# Patient Record
Sex: MALE | Race: BLACK OR AFRICAN AMERICAN | NOT HISPANIC OR LATINO | Employment: FULL TIME | URBAN - METROPOLITAN AREA
[De-identification: names, ages, dates, MRNs, and addresses within clinical notes are randomized per-mention and may not be internally consistent; named-entity substitution may affect disease eponyms.]

---

## 2018-02-05 ENCOUNTER — HOSPITAL ENCOUNTER (EMERGENCY)
Facility: HOSPITAL | Age: 48
Discharge: HOME | End: 2018-02-05
Attending: EMERGENCY MEDICINE | Admitting: EMERGENCY MEDICINE

## 2018-02-05 VITALS
SYSTOLIC BLOOD PRESSURE: 198 MMHG | WEIGHT: 197.75 LBS | RESPIRATION RATE: 18 BRPM | OXYGEN SATURATION: 100 % | DIASTOLIC BLOOD PRESSURE: 120 MMHG | TEMPERATURE: 97.8 F | BODY MASS INDEX: 27.58 KG/M2 | HEART RATE: 94 BPM

## 2018-02-05 DIAGNOSIS — B86 NORWEGIAN SCABIES: Primary | ICD-10-CM

## 2018-02-05 PROCEDURE — 96372 THER/PROPH/DIAG INJ SC/IM: CPT

## 2018-02-05 PROCEDURE — 99282 EMERGENCY DEPT VISIT SF MDM: CPT

## 2018-02-05 RX ORDER — IVERMECTIN 3 MG/1
200 TABLET ORAL ONCE
Qty: 42 TABLET | Refills: 0 | Status: SHIPPED | OUTPATIENT
Start: 2018-02-05 | End: 2018-02-05

## 2018-02-05 RX ORDER — PERMETHRIN 50 MG/G
CREAM TOPICAL
Qty: 60 G | Refills: 6 | Status: SHIPPED | OUTPATIENT
Start: 2018-02-05 | End: 2021-02-07

## 2018-02-05 RX ORDER — PERMETHRIN 50 MG/G
CREAM TOPICAL ONCE
Status: COMPLETED | OUTPATIENT
Start: 2018-02-05 | End: 2018-02-05

## 2018-02-05 RX ORDER — METHYLPREDNISOLONE ACETATE 80 MG/ML
40 INJECTION, SUSPENSION INTRA-ARTICULAR; INTRALESIONAL; INTRAMUSCULAR; SOFT TISSUE ONCE
Status: COMPLETED | OUTPATIENT
Start: 2018-02-05 | End: 2018-02-05

## 2018-02-05 RX ORDER — KETOROLAC TROMETHAMINE 30 MG/ML
30 INJECTION, SOLUTION INTRAMUSCULAR; INTRAVENOUS ONCE
Status: COMPLETED | OUTPATIENT
Start: 2018-02-05 | End: 2018-02-05

## 2018-02-05 RX ADMIN — KETOROLAC TROMETHAMINE 30 MG: 30 INJECTION, SOLUTION INTRAMUSCULAR at 04:09

## 2018-02-05 RX ADMIN — PERMETHRIN: 50 CREAM TOPICAL at 04:10

## 2018-02-05 RX ADMIN — METHYLPREDNISOLONE ACETATE 40 MG: 80 INJECTION, SUSPENSION INTRA-ARTICULAR; INTRALESIONAL; INTRAMUSCULAR; SOFT TISSUE at 04:10

## 2018-02-05 NOTE — ED PROVIDER NOTES
History  Chief Complaint   Patient presents with    Rash     Worsening rash since this morning  Was evaluated at [facility] but has had no relief in symptoms  Started in lower abdomen and is rising  Burning pain present, itchy  Denies difficulty breathing/swallowing  Nonlabored respirations present  This is a 56year old male who states he has had itching around his lower abdomen, at waist, and under bilateral arms about 1 week ago  He states he went to [facility] and was given something for his BP and a "2% cream"  He states he through the cream out  He states he was living in a place that had bed bugs but not known scabies  He states that the rash itches and burns  History provided by:  Patient and medical records   used: No    Rash   Location:  Pelvis (B/L arm pits )  Pelvic rash location:  Pelvis, penis, perineum and groin  Severity:  Severe  Onset quality:  Gradual  Duration:  1 week  Timing:  Constant  Progression:  Worsening  Chronicity:  New  Context: insect bite/sting    Relieved by:  Nothing  Ineffective treatments:  Anti-fungal cream and topical steroids      None       Past Medical History:   Diagnosis Date    Hypertension        History reviewed  No pertinent surgical history  History reviewed  No pertinent family history  I have reviewed and agree with the history as documented  Social History   Substance Use Topics    Smoking status: Never Smoker    Smokeless tobacco: Never Used    Alcohol use Yes      Comment: Socially        Review of Systems   Constitutional: Negative  HENT: Negative  Eyes: Negative  Respiratory: Negative  Cardiovascular: Negative  Gastrointestinal: Negative  Endocrine: Negative  Genitourinary: Negative  Musculoskeletal: Negative  Skin: Positive for rash  Allergic/Immunologic: Negative  Neurological: Negative  Hematological: Negative  Psychiatric/Behavioral: Negative          Physical Exam  ED Triage Vitals [02/05/18 0350]   Temperature Pulse Respirations Blood Pressure SpO2   97 8 °F (36 6 °C) 94 18 (!) 198/120 100 %      Temp Source Heart Rate Source Patient Position - Orthostatic VS BP Location FiO2 (%)   Oral Monitor Sitting Right arm --      Pain Score       8           Orthostatic Vital Signs  Vitals:    02/05/18 0350   BP: (!) 198/120   Pulse: 94   Patient Position - Orthostatic VS: Sitting       Physical Exam   Constitutional: He is oriented to person, place, and time  He appears well-developed and well-nourished  He appears distressed  Musculoskeletal: Normal range of motion  Neurological: He is alert and oriented to person, place, and time  Skin: Skin is warm and dry  Rash noted  He is not diaphoretic  There is a dry scaly brown colored rash with red pinpoint scabbed areas over lower abdomen, groin, pelvis pubis area  into umbilicus area  No oozing noted  Same description at B/L axillae areas    Psychiatric: He has a normal mood and affect  His behavior is normal  Judgment and thought content normal    Nursing note and vitals reviewed  ED Medications  Medications   methylPREDNISolone acetate (DEPO-MEDROL) injection 40 mg (40 mg Intramuscular Given 2/5/18 0410)   ketorolac (TORADOL) injection 30 mg (30 mg Intramuscular Given 2/5/18 0409)   permethrin (ELIMITE) 5 % cream ( Topical Given 2/5/18 0410)       Diagnostic Studies  Results Reviewed     None                 No orders to display              Procedures  Procedures       Phone Contacts  ED Phone Contact    ED Course  ED Course as of Feb 05 0429   Mon Feb 05, 2018   0428 Pt verbalizes understanding of all d/c instructions and procedures                                    MDM  Number of Diagnoses or Management Options  [place] scabies:   Diagnosis management comments: [place] Scabies     permetherin Topical  Ivermectin po     Pt will need follow up with PCP          Amount and/or Complexity of Data Reviewed  Review and summarize past medical records: yes      CritCare Time    Disposition  Final diagnoses:   [place] scabies     Time reflects when diagnosis was documented in both MDM as applicable and the Disposition within this note     Time User Action Codes Description Comment    2/5/2018  4:09 AM Tyrese, [address] [facility] scabies       ED Disposition     ED Disposition Condition Comment    Discharge  Josephine Mendenhall discharge to home/self care  Condition at discharge: Good        Follow-up Information     Follow up With Specialties Details Why Contact Info Additional [facility] [place] [facility] Schedule an appointment as soon as possible for a visit in 3 days  [address] [place] [phone]  [address] [facility] will need to find a PCP for follow up - call this number for a physician  [address] [facility] Emergency Medicine  If symptoms worsen [address] [facility] ED, [address] [place], [place], [address]        Patient's Medications   Discharge Prescriptions    IVERMECTIN (STROMECTOL) 3 MG TABS    Take 6 tablets (18,000 mcg total) by mouth once for 1 dose Take 6 tabs po on day 1, 2, 8, 9, 15, 22, 29  Starting 2/5/18       Start Date: 2/5/2018  End Date: 2/5/2018       Order Dose: 18,000 mcg       Quantity: 42 tablet    Refills: 0    PERMETHRIN (ELIMITE) 5 % CREAM    Apply neck to toes every other day x 1 week  Then apply 2 x week until cured       Start Date: 2/5/2018  End Date: --       Order Dose: --       Quantity: 60 g    Refills: 6     No discharge procedures on file      ED Provider  Electronically Signed by           Lakshmi Juárez  02/05/18 7203

## 2018-02-05 NOTE — DISCHARGE INSTRUCTIONS
You appear to have [place] scabies  This is contagious  You must follow the directions for the medications as prescribed  You must wash all clothing  Avoid personal contact  Follow up with a PCP      Scabies   WHAT YOU NEED TO KNOW:   Scabies is a skin condition that is caused by scabies mites  Scabies mites are tiny bugs that burrow, lay eggs, and live underneath the skin  Scabies is spread through close contact with a person who has scabies  This includes having sex, sleeping in the same bed, or sharing towels or clothing  Scabies can spread quickly and must be treated as soon as it is found  DISCHARGE INSTRUCTIONS:   Return to the emergency department if:   · You develop a fever and red, swollen, painful areas on your skin  Contact your healthcare provider if:   · The bites become crusty or filled with pus  · You have worsening itching after scabies treatment  · You have new bite or burrow marks after treatment  · You have questions or concerns about your condition or care  Medicines:   · Prescription creams  are used to treat scabies  You will need to apply them over all of your body from the neck down  Do not swallow this medicine  An oral medication may be ordered if scabies is severe  · Take your medicine as directed  Contact your healthcare provider if you think your medicine is not helping or if you have side effects  Tell him or her if you are allergic to any medicine  Keep a list of the medicines, vitamins, and herbs you take  Include the amounts, and when and why you take them  Bring the list or the pill bottles to follow-up visits  Carry your medicine list with you in case of an emergency  Follow up with your healthcare provider as directed:  Write down your questions so you remember to ask them during your visits  Prevent the spread of scabies:   · Have all family members use scabies medicine   Tell all sex partners and anyone who has shared your clothing or bed for the past month about the scabies  Tell them to ask their healthcare provider for scabies medicine even if they have no itching, rash, or burrow marks  · Wash all items that you have used since 3 days before you learned about your scabies  Use hot water to wash all clothing, bedding, and towels  Dry them for at least 20 minutes on the hot cycle of a dryer  Take items to be dry cleaned that cannot be washed in a washing machine  Place any clothing or bedding that cannot be washed or dry cleaned in a closed plastic bag for 1 week  · Do not have close body contact with anyone until the scabies mites are gone  Talk to your healthcare provider about how long you need to wait  Also ask about public places to avoid, such as the gym  Help relieve itching: Your skin may continue to itch for 2 or 3 weeks, even after the scabies mites are gone  Over-the-counter antihistamines or cortisone cream may help relieve itching  Trim your fingernails so you do not spread any mites that are still alive after treatment  Do not scratch your skin  Scratches may cause a skin infection  A cool bath may also help relieve the itching  Return to school or work: You may return to school or work 24 hours after using scabies medicine  © 2017 [address] [employer] Information is for End User's use only and may not be sold, redistributed or otherwise used for commercial purposes  All illustrations and images included in CareNotes® are the copyrighted property of [employer]  or [employer]  The above information is an  only  It is not intended as medical advice for individual conditions or treatments  Talk to your doctor, nurse or pharmacist before following any medical regimen to see if it is safe and effective for you

## 2019-04-30 ENCOUNTER — HOSPITAL ENCOUNTER (EMERGENCY)
Facility: HOSPITAL | Age: 49
Discharge: HOME | End: 2019-04-30
Attending: EMERGENCY MEDICINE

## 2019-04-30 VITALS
BODY MASS INDEX: 27.33 KG/M2 | RESPIRATION RATE: 16 BRPM | HEART RATE: 76 BPM | SYSTOLIC BLOOD PRESSURE: 170 MMHG | WEIGHT: 195.99 LBS | TEMPERATURE: 97.4 F | DIASTOLIC BLOOD PRESSURE: 120 MMHG | OXYGEN SATURATION: 100 %

## 2019-04-30 DIAGNOSIS — I10 HYPERTENSION: ICD-10-CM

## 2019-04-30 DIAGNOSIS — L25.9 CONTACT DERMATITIS: Primary | ICD-10-CM

## 2019-04-30 PROCEDURE — 99282 EMERGENCY DEPT VISIT SF MDM: CPT

## 2019-04-30 PROCEDURE — 99283 EMERGENCY DEPT VISIT LOW MDM: CPT | Performed by: PHYSICIAN ASSISTANT

## 2019-04-30 RX ORDER — HYDROCORTISONE 25 MG/ML
LOTION TOPICAL 2 TIMES DAILY
Qty: 50 ML | Refills: 0 | Status: SHIPPED | OUTPATIENT
Start: 2019-04-30 | End: 2021-02-07

## 2019-04-30 RX ORDER — HYDROCHLOROTHIAZIDE 25 MG/1
25 TABLET ORAL DAILY
Qty: 20 TABLET | Refills: 0 | Status: SHIPPED | OUTPATIENT
Start: 2019-04-30 | End: 2021-02-11 | Stop reason: HOSPADM

## 2021-02-07 ENCOUNTER — APPOINTMENT (EMERGENCY)
Dept: RADIOLOGY | Facility: HOSPITAL | Age: 51
DRG: 563 | End: 2021-02-07

## 2021-02-07 ENCOUNTER — HOSPITAL ENCOUNTER (INPATIENT)
Facility: HOSPITAL | Age: 51
LOS: 2 days | Discharge: HOME | DRG: 563 | End: 2021-02-11
Attending: EMERGENCY MEDICINE | Admitting: INTERNAL MEDICINE

## 2021-02-07 DIAGNOSIS — S82.401A: ICD-10-CM

## 2021-02-07 DIAGNOSIS — I16.9 HYPERTENSIVE CRISIS: Primary | ICD-10-CM

## 2021-02-07 DIAGNOSIS — S82.409A FIBULA FRACTURE: ICD-10-CM

## 2021-02-07 DIAGNOSIS — D64.9 ANEMIA, UNSPECIFIED TYPE: ICD-10-CM

## 2021-02-07 DIAGNOSIS — N17.9 AKI (ACUTE KIDNEY INJURY) (HCC): ICD-10-CM
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
## 2021-02-07 PROBLEM — S99.919A ANKLE INJURY: Status: ACTIVE | Noted: 2021-02-07

## 2021-02-07 PROBLEM — I16.0 HYPERTENSIVE URGENCY: Status: ACTIVE | Noted: 2021-02-07

## 2021-02-07 PROBLEM — I10 ESSENTIAL HYPERTENSION: Status: ACTIVE | Noted: 2021-02-07

## 2021-02-07 LAB
ALBUMIN SERPL BCP-MCNC: 4 G/DL (ref 3.5–5)
ALP SERPL-CCNC: 71 U/L (ref 46–116)
ALT SERPL W P-5'-P-CCNC: 24 U/L (ref 12–78)
ANION GAP SERPL CALCULATED.3IONS-SCNC: 7 MMOL/L (ref 4–13)
APTT PPP: 28 SECONDS (ref 23–37)
AST SERPL W P-5'-P-CCNC: 22 U/L (ref 5–45)
BASOPHILS # BLD AUTO: 0.01 THOUSANDS/ΜL (ref 0–0.1)
BASOPHILS NFR BLD AUTO: 0 % (ref 0–1)
BILIRUB SERPL-MCNC: 0.33 MG/DL (ref 0.2–1)
BUN SERPL-MCNC: 17 MG/DL (ref 5–25)
CALCIUM SERPL-MCNC: 9 MG/DL (ref 8.3–10.1)
CHLORIDE SERPL-SCNC: 104 MMOL/L (ref 100–108)
CO2 SERPL-SCNC: 28 MMOL/L (ref 21–32)
CREAT SERPL-MCNC: 1.72 MG/DL (ref 0.6–1.3)
EOSINOPHIL # BLD AUTO: 0.02 THOUSAND/ΜL (ref 0–0.61)
EOSINOPHIL NFR BLD AUTO: 0 % (ref 0–6)
ERYTHROCYTE [DISTWIDTH] IN BLOOD BY AUTOMATED COUNT: 13.3 % (ref 11.6–15.1)
GFR SERPL CREATININE-BSD FRML MDRD: 53 ML/MIN/1.73SQ M
GLUCOSE SERPL-MCNC: 111 MG/DL (ref 65–140)
HCT VFR BLD AUTO: 36.9 % (ref 36.5–49.3)
HGB BLD-MCNC: 11.5 G/DL (ref 12–17)
IMM GRANULOCYTES # BLD AUTO: 0.02 THOUSAND/UL (ref 0–0.2)
IMM GRANULOCYTES NFR BLD AUTO: 0 % (ref 0–2)
INR PPP: 1.09 (ref 0.84–1.19)
LYMPHOCYTES # BLD AUTO: 1.47 THOUSANDS/ΜL (ref 0.6–4.47)
LYMPHOCYTES NFR BLD AUTO: 24 % (ref 14–44)
MCH RBC QN AUTO: 26.9 PG (ref 26.8–34.3)
MCHC RBC AUTO-ENTMCNC: 31.2 G/DL (ref 31.4–37.4)
MCV RBC AUTO: 86 FL (ref 82–98)
MONOCYTES # BLD AUTO: 0.51 THOUSAND/ΜL (ref 0.17–1.22)
MONOCYTES NFR BLD AUTO: 8 % (ref 4–12)
NEUTROPHILS # BLD AUTO: 4.12 THOUSANDS/ΜL (ref 1.85–7.62)
NEUTS SEG NFR BLD AUTO: 68 % (ref 43–75)
NRBC BLD AUTO-RTO: 0 /100 WBCS
PLATELET # BLD AUTO: 262 THOUSANDS/UL (ref 149–390)
PMV BLD AUTO: 10.8 FL (ref 8.9–12.7)
POTASSIUM SERPL-SCNC: 3.8 MMOL/L (ref 3.5–5.3)
PROT SERPL-MCNC: 8.4 G/DL (ref 6.4–8.2)
PROTHROMBIN TIME: 13.9 SECONDS (ref 11.6–14.5)
RBC # BLD AUTO: 4.28 MILLION/UL (ref 3.88–5.62)
SODIUM SERPL-SCNC: 139 MMOL/L (ref 136–145)
TROPONIN I SERPL-MCNC: <0.02 NG/ML
WBC # BLD AUTO: 6.15 THOUSAND/UL (ref 4.31–10.16)

## 2021-02-07 PROCEDURE — 80053 COMPREHEN METABOLIC PANEL: CPT | Performed by: NURSE PRACTITIONER

## 2021-02-07 PROCEDURE — 85610 PROTHROMBIN TIME: CPT | Performed by: NURSE PRACTITIONER

## 2021-02-07 PROCEDURE — 84484 ASSAY OF TROPONIN QUANT: CPT | Performed by: NURSE PRACTITIONER

## 2021-02-07 PROCEDURE — 36415 COLL VENOUS BLD VENIPUNCTURE: CPT | Performed by: NURSE PRACTITIONER

## 2021-02-07 PROCEDURE — 85025 COMPLETE CBC W/AUTO DIFF WBC: CPT | Performed by: NURSE PRACTITIONER

## 2021-02-07 PROCEDURE — 99285 EMERGENCY DEPT VISIT HI MDM: CPT | Performed by: NURSE PRACTITIONER

## 2021-02-07 PROCEDURE — 73610 X-RAY EXAM OF ANKLE: CPT

## 2021-02-07 PROCEDURE — 96375 TX/PRO/DX INJ NEW DRUG ADDON: CPT

## 2021-02-07 PROCEDURE — 71045 X-RAY EXAM CHEST 1 VIEW: CPT

## 2021-02-07 PROCEDURE — 73590 X-RAY EXAM OF LOWER LEG: CPT

## 2021-02-07 PROCEDURE — 82550 ASSAY OF CK (CPK): CPT | Performed by: INTERNAL MEDICINE

## 2021-02-07 PROCEDURE — 99285 EMERGENCY DEPT VISIT HI MDM: CPT

## 2021-02-07 PROCEDURE — 29505 APPLICATION LONG LEG SPLINT: CPT | Performed by: NURSE PRACTITIONER

## 2021-02-07 PROCEDURE — 99220 PR INITIAL OBSERVATION CARE/DAY 70 MINUTES: CPT | Performed by: INTERNAL MEDICINE

## 2021-02-07 PROCEDURE — 82553 CREATINE MB FRACTION: CPT | Performed by: INTERNAL MEDICINE

## 2021-02-07 PROCEDURE — 2W3QX1Z IMMOBILIZATION OF RIGHT LOWER LEG USING SPLINT: ICD-10-PCS | Performed by: EMERGENCY MEDICINE

## 2021-02-07 PROCEDURE — 85730 THROMBOPLASTIN TIME PARTIAL: CPT | Performed by: NURSE PRACTITIONER

## 2021-02-07 PROCEDURE — 96376 TX/PRO/DX INJ SAME DRUG ADON: CPT

## 2021-02-07 PROCEDURE — 96374 THER/PROPH/DIAG INJ IV PUSH: CPT

## 2021-02-07 PROCEDURE — 93005 ELECTROCARDIOGRAM TRACING: CPT

## 2021-02-07 RX ORDER — SODIUM CHLORIDE 9 MG/ML
125 INJECTION, SOLUTION INTRAVENOUS CONTINUOUS
Status: DISCONTINUED | OUTPATIENT
Start: 2021-02-07 | End: 2021-02-08

## 2021-02-07 RX ORDER — OXYCODONE HYDROCHLORIDE 5 MG/1
5 TABLET ORAL EVERY 4 HOURS PRN
Status: DISCONTINUED | OUTPATIENT
Start: 2021-02-07 | End: 2021-02-11 | Stop reason: HOSPADM

## 2021-02-07 RX ORDER — HYDROMORPHONE HCL/PF 1 MG/ML
0.5 SYRINGE (ML) INJECTION
Status: DISCONTINUED | OUTPATIENT
Start: 2021-02-07 | End: 2021-02-11 | Stop reason: HOSPADM

## 2021-02-07 RX ORDER — SODIUM CHLORIDE 9 MG/ML
75 INJECTION, SOLUTION INTRAVENOUS CONTINUOUS
Status: DISCONTINUED | OUTPATIENT
Start: 2021-02-07 | End: 2021-02-07

## 2021-02-07 RX ORDER — LABETALOL 20 MG/4 ML (5 MG/ML) INTRAVENOUS SYRINGE
10 ONCE
Status: COMPLETED | OUTPATIENT
Start: 2021-02-07 | End: 2021-02-07

## 2021-02-07 RX ORDER — ACETAMINOPHEN 325 MG/1
975 TABLET ORAL EVERY 8 HOURS SCHEDULED
Status: DISCONTINUED | OUTPATIENT
Start: 2021-02-07 | End: 2021-02-11 | Stop reason: HOSPADM

## 2021-02-07 RX ORDER — HYDRALAZINE HYDROCHLORIDE 20 MG/ML
10 INJECTION INTRAMUSCULAR; INTRAVENOUS ONCE
Status: DISCONTINUED | OUTPATIENT
Start: 2021-02-07 | End: 2021-02-07

## 2021-02-07 RX ORDER — LABETALOL 20 MG/4 ML (5 MG/ML) INTRAVENOUS SYRINGE
10 EVERY 6 HOURS PRN
Status: DISCONTINUED | OUTPATIENT
Start: 2021-02-07 | End: 2021-02-08

## 2021-02-07 RX ORDER — AMLODIPINE BESYLATE 5 MG/1
5 TABLET ORAL DAILY
Status: DISCONTINUED | OUTPATIENT
Start: 2021-02-08 | End: 2021-02-08

## 2021-02-07 RX ORDER — HEPARIN SODIUM 5000 [USP'U]/ML
5000 INJECTION, SOLUTION INTRAVENOUS; SUBCUTANEOUS EVERY 8 HOURS SCHEDULED
Status: DISCONTINUED | OUTPATIENT
Start: 2021-02-07 | End: 2021-02-11 | Stop reason: HOSPADM

## 2021-02-07 RX ORDER — HYDROXYZINE HYDROCHLORIDE 25 MG/1
25 TABLET, FILM COATED ORAL EVERY 6 HOURS PRN
Status: DISCONTINUED | OUTPATIENT
Start: 2021-02-07 | End: 2021-02-11 | Stop reason: HOSPADM

## 2021-02-07 RX ORDER — OXYCODONE HYDROCHLORIDE 10 MG/1
10 TABLET ORAL EVERY 4 HOURS PRN
Status: DISCONTINUED | OUTPATIENT
Start: 2021-02-07 | End: 2021-02-11 | Stop reason: HOSPADM

## 2021-02-07 RX ORDER — DOCUSATE SODIUM 100 MG/1
100 CAPSULE, LIQUID FILLED ORAL 2 TIMES DAILY
Status: DISCONTINUED | OUTPATIENT
Start: 2021-02-08 | End: 2021-02-11 | Stop reason: HOSPADM

## 2021-02-07 RX ADMIN — HEPARIN SODIUM 5000 UNITS: 5000 INJECTION INTRAVENOUS; SUBCUTANEOUS at 22:59

## 2021-02-07 RX ADMIN — LABETALOL 20 MG/4 ML (5 MG/ML) INTRAVENOUS SYRINGE 10 MG: at 20:37

## 2021-02-07 RX ADMIN — LABETALOL 20 MG/4 ML (5 MG/ML) INTRAVENOUS SYRINGE 10 MG: at 21:15

## 2021-02-07 RX ADMIN — HYDROMORPHONE HYDROCHLORIDE 0.5 MG: 1 INJECTION, SOLUTION INTRAMUSCULAR; INTRAVENOUS; SUBCUTANEOUS at 23:02

## 2021-02-07 RX ADMIN — SODIUM CHLORIDE 75 ML/HR: 0.9 INJECTION, SOLUTION INTRAVENOUS at 21:15

## 2021-02-07 RX ADMIN — ACETAMINOPHEN 975 MG: 325 TABLET ORAL at 22:59

## 2021-02-07 RX ADMIN — MORPHINE SULFATE 2 MG: 2 INJECTION, SOLUTION INTRAMUSCULAR; INTRAVENOUS at 19:55

## 2021-02-07 RX ADMIN — SODIUM CHLORIDE 125 ML/HR: 0.9 INJECTION, SOLUTION INTRAVENOUS at 22:58

## 2021-02-08 PROBLEM — D64.9 NORMOCYTIC ANEMIA: Status: ACTIVE | Noted: 2021-02-08

## 2021-02-08 LAB
ANION GAP SERPL CALCULATED.3IONS-SCNC: 10 MMOL/L (ref 4–13)
ATRIAL RATE: 87 BPM
BACTERIA UR QL AUTO: ABNORMAL /HPF
BILIRUB UR QL STRIP: NEGATIVE
BUN SERPL-MCNC: 15 MG/DL (ref 5–25)
CALCIUM SERPL-MCNC: 8.4 MG/DL (ref 8.3–10.1)
CHLORIDE SERPL-SCNC: 105 MMOL/L (ref 100–108)
CK MB SERPL-MCNC: 1 NG/ML (ref 0–5)
CK MB SERPL-MCNC: <1 % (ref 0–2.5)
CK SERPL-CCNC: 467 U/L (ref 39–308)
CLARITY UR: CLEAR
CO2 SERPL-SCNC: 24 MMOL/L (ref 21–32)
COLOR UR: YELLOW
CREAT SERPL-MCNC: 1.38 MG/DL (ref 0.6–1.3)
ERYTHROCYTE [DISTWIDTH] IN BLOOD BY AUTOMATED COUNT: 13.3 % (ref 11.6–15.1)
GFR SERPL CREATININE-BSD FRML MDRD: 69 ML/MIN/1.73SQ M
GLUCOSE P FAST SERPL-MCNC: 95 MG/DL (ref 65–99)
GLUCOSE SERPL-MCNC: 95 MG/DL (ref 65–140)
GLUCOSE UR STRIP-MCNC: NEGATIVE MG/DL
HCT VFR BLD AUTO: 33 % (ref 36.5–49.3)
HGB BLD-MCNC: 10.5 G/DL (ref 12–17)
HGB UR QL STRIP.AUTO: NEGATIVE
KETONES UR STRIP-MCNC: NEGATIVE MG/DL
LEUKOCYTE ESTERASE UR QL STRIP: NEGATIVE
MCH RBC QN AUTO: 27.8 PG (ref 26.8–34.3)
MCHC RBC AUTO-ENTMCNC: 31.8 G/DL (ref 31.4–37.4)
MCV RBC AUTO: 87 FL (ref 82–98)
NITRITE UR QL STRIP: NEGATIVE
NON-SQ EPI CELLS URNS QL MICRO: ABNORMAL /HPF
P AXIS: 53 DEGREES
PH UR STRIP.AUTO: 5.5 [PH]
PLATELET # BLD AUTO: 223 THOUSANDS/UL (ref 149–390)
PMV BLD AUTO: 11 FL (ref 8.9–12.7)
POTASSIUM SERPL-SCNC: 3.9 MMOL/L (ref 3.5–5.3)
PR INTERVAL: 190 MS
PROT UR STRIP-MCNC: NEGATIVE MG/DL
QRS AXIS: 29 DEGREES
QRSD INTERVAL: 92 MS
QT INTERVAL: 388 MS
QTC INTERVAL: 466 MS
RBC # BLD AUTO: 3.78 MILLION/UL (ref 3.88–5.62)
RBC #/AREA URNS AUTO: ABNORMAL /HPF
SODIUM SERPL-SCNC: 139 MMOL/L (ref 136–145)
SP GR UR STRIP.AUTO: >=1.03 (ref 1–1.03)
T WAVE AXIS: 23 DEGREES
UROBILINOGEN UR QL STRIP.AUTO: 0.2 E.U./DL
VENTRICULAR RATE: 87 BPM
WBC # BLD AUTO: 5.83 THOUSAND/UL (ref 4.31–10.16)
WBC #/AREA URNS AUTO: ABNORMAL /HPF

## 2021-02-08 PROCEDURE — 97760 ORTHOTIC MGMT&TRAING 1ST ENC: CPT

## 2021-02-08 PROCEDURE — 85027 COMPLETE CBC AUTOMATED: CPT | Performed by: INTERNAL MEDICINE

## 2021-02-08 PROCEDURE — 80048 BASIC METABOLIC PNL TOTAL CA: CPT | Performed by: INTERNAL MEDICINE

## 2021-02-08 PROCEDURE — 81001 URINALYSIS AUTO W/SCOPE: CPT | Performed by: INTERNAL MEDICINE

## 2021-02-08 PROCEDURE — 93010 ELECTROCARDIOGRAM REPORT: CPT | Performed by: INTERNAL MEDICINE

## 2021-02-08 PROCEDURE — 99243 OFF/OP CNSLTJ NEW/EST LOW 30: CPT | Performed by: PHYSICIAN ASSISTANT

## 2021-02-08 PROCEDURE — 97163 PT EVAL HIGH COMPLEX 45 MIN: CPT

## 2021-02-08 PROCEDURE — 99225 PR SBSQ OBSERVATION CARE/DAY 25 MINUTES: CPT | Performed by: PHYSICIAN ASSISTANT

## 2021-02-08 RX ORDER — AMLODIPINE BESYLATE 5 MG/1
5 TABLET ORAL DAILY
Status: DISCONTINUED | OUTPATIENT
Start: 2021-02-08 | End: 2021-02-09

## 2021-02-08 RX ORDER — LABETALOL 20 MG/4 ML (5 MG/ML) INTRAVENOUS SYRINGE
15 EVERY 6 HOURS PRN
Status: DISCONTINUED | OUTPATIENT
Start: 2021-02-08 | End: 2021-02-09

## 2021-02-08 RX ORDER — LABETALOL 20 MG/4 ML (5 MG/ML) INTRAVENOUS SYRINGE
10 EVERY 6 HOURS PRN
Status: DISCONTINUED | OUTPATIENT
Start: 2021-02-08 | End: 2021-02-08

## 2021-02-08 RX ORDER — LABETALOL 20 MG/4 ML (5 MG/ML) INTRAVENOUS SYRINGE
10 ONCE
Status: COMPLETED | OUTPATIENT
Start: 2021-02-08 | End: 2021-02-08

## 2021-02-08 RX ORDER — HYDRALAZINE HYDROCHLORIDE 20 MG/ML
5 INJECTION INTRAMUSCULAR; INTRAVENOUS ONCE
Status: DISCONTINUED | OUTPATIENT
Start: 2021-02-08 | End: 2021-02-08

## 2021-02-08 RX ORDER — HYDROCHLOROTHIAZIDE 12.5 MG/1
12.5 TABLET ORAL DAILY
Status: DISCONTINUED | OUTPATIENT
Start: 2021-02-09 | End: 2021-02-10

## 2021-02-08 RX ADMIN — HEPARIN SODIUM 5000 UNITS: 5000 INJECTION INTRAVENOUS; SUBCUTANEOUS at 15:04

## 2021-02-08 RX ADMIN — OXYCODONE HYDROCHLORIDE 10 MG: 10 TABLET ORAL at 15:14

## 2021-02-08 RX ADMIN — LABETALOL 20 MG/4 ML (5 MG/ML) INTRAVENOUS SYRINGE 10 MG: at 00:48

## 2021-02-08 RX ADMIN — HEPARIN SODIUM 5000 UNITS: 5000 INJECTION INTRAVENOUS; SUBCUTANEOUS at 21:02

## 2021-02-08 RX ADMIN — OXYCODONE HYDROCHLORIDE 10 MG: 10 TABLET ORAL at 05:57

## 2021-02-08 RX ADMIN — AMLODIPINE BESYLATE 5 MG: 5 TABLET ORAL at 00:48

## 2021-02-08 RX ADMIN — LABETALOL 20 MG/4 ML (5 MG/ML) INTRAVENOUS SYRINGE 10 MG: at 03:03

## 2021-02-08 RX ADMIN — OXYCODONE HYDROCHLORIDE 10 MG: 10 TABLET ORAL at 00:47

## 2021-02-08 RX ADMIN — ACETAMINOPHEN 975 MG: 325 TABLET ORAL at 21:02

## 2021-02-08 RX ADMIN — LABETALOL 20 MG/4 ML (5 MG/ML) INTRAVENOUS SYRINGE 10 MG: at 15:04

## 2021-02-08 RX ADMIN — ACETAMINOPHEN 975 MG: 325 TABLET ORAL at 15:04

## 2021-02-08 RX ADMIN — SODIUM CHLORIDE 125 ML/HR: 0.9 INJECTION, SOLUTION INTRAVENOUS at 06:05

## 2021-02-08 RX ADMIN — OXYCODONE HYDROCHLORIDE 10 MG: 10 TABLET ORAL at 21:15

## 2021-02-08 RX ADMIN — HEPARIN SODIUM 5000 UNITS: 5000 INJECTION INTRAVENOUS; SUBCUTANEOUS at 05:56

## 2021-02-08 RX ADMIN — OXYCODONE HYDROCHLORIDE 10 MG: 10 TABLET ORAL at 10:58

## 2021-02-08 RX ADMIN — HYDROMORPHONE HYDROCHLORIDE 0.5 MG: 1 INJECTION, SOLUTION INTRAMUSCULAR; INTRAVENOUS; SUBCUTANEOUS at 16:39

## 2021-02-08 RX ADMIN — ACETAMINOPHEN 975 MG: 325 TABLET ORAL at 05:55

## 2021-02-08 RX ADMIN — HYDROMORPHONE HYDROCHLORIDE 0.5 MG: 1 INJECTION, SOLUTION INTRAMUSCULAR; INTRAVENOUS; SUBCUTANEOUS at 03:02

## 2021-02-08 RX ADMIN — LABETALOL 20 MG/4 ML (5 MG/ML) INTRAVENOUS SYRINGE 10 MG: at 21:15

## 2021-02-08 NOTE — ED PROVIDER NOTES
History  Chief Complaint   Patient presents with    Knee Injury     Patient reports that he fell on to black ice this morning, his right knee broke his fall, now pain, swelling and bruising  Patient is hypertensive in triage and he states that he should be taking medications for his HTN but does not  52 y/o male presents to ED with right lower leg injury and ankle pain and swelling at 9 am today  States he slipped on the ice while taking out the trasht, hit his knee  C/o pain, swelling of right ankle  Hx of medial right ankle injury as a child  He states he did take some tylenol about 1 5 hours prior to ED arrival and had placed ice on it earlier today  He states he was driving today  Pt is severely hypertensive on arrival; reports he is not taking his HTN meds due to an insurance issue  Female at bedside states he has not taken in years  Family member present with pt states he does not see a does not see a PCP; they want to find a doctor to follow with routinely  Pt does not believe his blood pressure is normally this high, thinks it is due to the current situation  He denies any CP, SOB, n/v/d, headache  History provided by:  Patient and significant other   used: No        Prior to Admission Medications   Prescriptions Last Dose Informant Patient Reported? Taking?   hydrochlorothiazide (HYDRODIURIL) 25 mg tablet Not Taking at Unknown time  No No   Sig: Take 1 tablet (25 mg total) by mouth daily   Patient not taking: Reported on 2/7/2021      Facility-Administered Medications: None       Past Medical History:   Diagnosis Date    Hypertension        Past Surgical History:   Procedure Laterality Date    MANDIBLE FRACTURE SURGERY         History reviewed  No pertinent family history  I have reviewed and agree with the history as documented      E-Cigarette/Vaping    E-Cigarette Use Never User      E-Cigarette/Vaping Substances     Social History     Tobacco Use    Smoking status: Never Smoker    Smokeless tobacco: Never Used   Substance Use Topics    Alcohol use: Not Currently     Comment: Socially    Drug use: Yes     Types: Marijuana     Comment: THC occasionally       Review of Systems   Musculoskeletal: Positive for arthralgias, gait problem and joint swelling  All other systems reviewed and are negative  Physical Exam  Physical Exam  Vitals signs and nursing note reviewed  Constitutional:       General: He is awake  He is not in acute distress  Appearance: Normal appearance  He is well-developed, well-groomed and normal weight  He is not ill-appearing, toxic-appearing or diaphoretic  HENT:      Head: Normocephalic and atraumatic  Nose: Nose normal       Mouth/Throat:      Mouth: Mucous membranes are moist    Eyes:      Extraocular Movements: Extraocular movements intact  Pupils: Pupils are equal, round, and reactive to light  Neck:      Musculoskeletal: Normal range of motion  Cardiovascular:      Rate and Rhythm: Normal rate and regular rhythm  Pulses: Normal pulses  Heart sounds: Normal heart sounds  Pulmonary:      Effort: Pulmonary effort is normal       Breath sounds: Normal breath sounds  Abdominal:      General: Abdomen is flat  Palpations: Abdomen is soft  Musculoskeletal:         General: Swelling, tenderness and signs of injury present  Right ankle: He exhibits swelling  He exhibits normal range of motion, no ecchymosis and normal pulse  Tenderness  Right lower leg: Edema present  Comments: Leg is compressible but slightly taught  Noted swelling from calf to toes  + Pedal pulse  Unable to plantar and dorsi flex due to swelling  Toes and foot are warm  Skin:     General: Skin is warm and dry  Capillary Refill: Capillary refill takes less than 2 seconds  Neurological:      General: No focal deficit present  Mental Status: He is alert and oriented to person, place, and time  Sensory: Sensation is intact  Psychiatric:         Mood and Affect: Mood normal          Behavior: Behavior normal  Behavior is cooperative  Thought Content:  Thought content normal          Judgment: Judgment normal          Vital Signs  ED Triage Vitals [02/07/21 1859]   Temperature Pulse Respirations Blood Pressure SpO2   99 4 °F (37 4 °C) 94 20 (!) 220/141 96 %      Temp Source Heart Rate Source Patient Position - Orthostatic VS BP Location FiO2 (%)   Oral Monitor Sitting Right arm --      Pain Score       Worst Possible Pain           Vitals:    02/07/21 2115 02/07/21 2130 02/07/21 2300 02/08/21 0019   BP: (!) 215/122 (!) 177/103 (!) 205/112 (!) 202/129   Pulse: 73 70 64 61   Patient Position - Orthostatic VS:             Visual Acuity      ED Medications  Medications   sodium chloride 0 9 % infusion (125 mL/hr Intravenous New Bag 2/7/21 2258)   heparin (porcine) subcutaneous injection 5,000 Units (5,000 Units Subcutaneous Given 2/7/21 2259)   oxyCODONE (ROXICODONE) IR tablet 5 mg (has no administration in time range)   oxyCODONE (ROXICODONE) immediate release tablet 10 mg (has no administration in time range)   HYDROmorphone (DILAUDID) injection 0 5 mg (0 5 mg Intravenous Given 2/7/21 2302)   docusate sodium (COLACE) capsule 100 mg (has no administration in time range)   acetaminophen (TYLENOL) tablet 975 mg (975 mg Oral Given 2/7/21 2259)   amLODIPine (NORVASC) tablet 5 mg (has no administration in time range)   Labetalol HCl (NORMODYNE) injection 10 mg (has no administration in time range)   hydrOXYzine HCL (ATARAX) tablet 25 mg (has no administration in time range)   morphine injection 2 mg (2 mg Intravenous Given 2/7/21 1955)   Labetalol HCl (NORMODYNE) injection 10 mg (10 mg Intravenous Given 2/7/21 2037)   Labetalol HCl (NORMODYNE) injection 10 mg (10 mg Intravenous Given 2/7/21 2115)       Diagnostic Studies  Results Reviewed     Procedure Component Value Units Date/Time    CKMB [[id number]] (Normal) Collected: 02/07/21 1954    Lab Status: Final result Specimen: Blood from Arm, Left Updated: 02/08/21 0009     CK-MB Index <1 0 %      CK-MB 1 0 ng/mL     CK (with reflex to MB) [[id number]]  (Abnormal) Collected: 02/07/21 1954    Lab Status: Final result Specimen: Blood from Arm, Left Updated: 02/08/21 0008     Total  U/L     Urinalysis with microscopic [[id number]]     Lab Status: No result Specimen: Urine     Troponin I [[id number]]  (Normal) Collected: 02/07/21 2016    Lab Status: Final result Specimen: Blood from Arm, Left Updated: 02/07/21 2050     Troponin I <0 02 ng/mL     Comprehensive metabolic panel [[id number]]  (Abnormal) Collected: 02/07/21 1954    Lab Status: Final result Specimen: Blood from Arm, Left Updated: 02/07/21 2024     Sodium 139 mmol/L      Potassium 3 8 mmol/L      Chloride 104 mmol/L      CO2 28 mmol/L      ANION GAP 7 mmol/L      BUN 17 mg/dL      Creatinine 1 72 mg/dL      Glucose 111 mg/dL      Calcium 9 0 mg/dL      AST 22 U/L      ALT 24 U/L      Alkaline Phosphatase 71 U/L      Total Protein 8 4 g/dL      Albumin 4 0 g/dL      Total Bilirubin 0 33 mg/dL      eGFR 53 ml/min/1 73sq m     Narrative:      [employer] guidelines for Chronic Kidney Disease (CKD):     Stage 1 with normal or high GFR (GFR > 90 mL/min/1 73 square meters)    Stage 2 Mild CKD (GFR = 60-89 mL/min/1 73 square meters)    Stage 3A Moderate CKD (GFR = 45-59 mL/min/1 73 square meters)    Stage 3B Moderate CKD (GFR = 30-44 mL/min/1 73 square meters)    Stage 4 Severe CKD (GFR = 15-29 mL/min/1 73 square meters)    Stage 5 End Stage CKD (GFR <15 mL/min/1 73 square meters)  Note: GFR calculation is accurate only with a steady state creatinine    Protime-INR [[id number]]  (Normal) Collected: 02/07/21 1954    Lab Status: Final result Specimen: Blood from Arm, Left Updated: 02/07/21 2018     Protime 13 9 seconds      INR 1 09    APTT [[id number]]  (Normal) Collected: 02/07/21 1954    Lab Status: Final result Specimen: Blood from Arm, Left Updated: 02/07/21 2018     PTT 28 seconds     CBC and differential [[id number]]  (Abnormal) Collected: 02/07/21 1954    Lab Status: Final result Specimen: Blood from Arm, Left Updated: 02/07/21 2005     WBC 6 15 Thousand/uL      RBC 4 28 Million/uL      Hemoglobin 11 5 g/dL      Hematocrit 36 9 %      MCV 86 fL      MCH 26 9 pg      MCHC 31 2 g/dL      RDW 13 3 %      MPV 10 8 fL      Platelets 101 Thousands/uL      nRBC 0 /100 WBCs      Neutrophils Relative 68 %      Immat GRANS % 0 %      Lymphocytes Relative 24 %      Monocytes Relative 8 %      Eosinophils Relative 0 %      Basophils Relative 0 %      Neutrophils Absolute 4 12 Thousands/µL      Immature Grans Absolute 0 02 Thousand/uL      Lymphocytes Absolute 1 47 Thousands/µL      Monocytes Absolute 0 51 Thousand/µL      Eosinophils Absolute 0 02 Thousand/µL      Basophils Absolute 0 01 Thousands/µL                  XR chest 1 view portable   ED Interpretation by MUSA Mares (02/07 2159)   Preliminary reading  Wide mediastinum with cardiomegaly    Waiting on rad read       XR ankle 3+ views RIGHT   ED Interpretation by MUSA Mares (02/07 2030)   Preliminary reading  No acute process seen  Waiting on rad read       XR tibia fibula 2 views RIGHT   ED Interpretation by Rex Canchola, [address] (02/07 2055)   Preliminary reading  + proximal fibula fracture shaft  Waiting on rad read                  Procedures  ECG 12 Lead Documentation Only    Date/Time: 2/7/2021 7:49 PM  Performed by: MUSA Mares  Authorized by: MUSA Mares     Indications / Diagnosis:  Hypertensive crisis  ECG reviewed by me, the ED Provider: yes Alysa Delgado MD)    Patient location:  ED  Previous ECG:     Previous ECG:  Unavailable  Interpretation:     Interpretation: abnormal    Rate:     ECG rate:  87    ECG rate assessment: normal    Rhythm:     Rhythm: sinus rhythm    Ectopy:     Ectopy: none    QRS: QRS axis:  Normal  Conduction:     Conduction: normal    T waves:     T waves: non-specific and inverted      Inverted:  AVL, V5 and V6  Other findings:     Other findings: prolonged qTc interval    Comments:      Possible left atrial enlargement    Splint application    Date/Time: 2/7/2021 9:25 PM  Performed by: MUSA Banks  Authorized by: MUSA Banks   Universal Protocol:  Consent: The procedure was performed in an emergent situation  Verbal consent obtained  Written consent obtained  Risks and benefits: risks, benefits and alternatives were discussed  Consent given by: patient  Time out: Immediately prior to procedure a "time out" was called to verify the correct patient, procedure, equipment, support staff and site/side marked as required  Timeout called at: 2/7/2021 9:25 PM   Patient understanding: patient states understanding of the procedure being performed  Patient consent: the patient's understanding of the procedure matches consent given  Procedure consent: procedure consent matches procedure scheduled  Relevant documents: relevant documents present and verified  Test results: test results available and properly labeled  Site marked: the operative site was marked  Radiology Images displayed and confirmed   If images not available, report reviewed: imaging studies available  Required items: required blood products, implants, devices, and special equipment available  Patient identity confirmed: verbally with patient, arm band and hospital-assigned identification number      Pre-procedure details:     Sensation:  Normal    Skin color:  Baseline, no ecchymosis, rubor or cyanosis  Procedure details:     Laterality:  Right    Location:  Leg    Leg:  R lower leg    Splint type:  Long leg    Supplies:  Cotton padding, elastic bandage and Ortho-Glass  Post-procedure details:     Pain:  Improved    Sensation:  Normal    Skin color:  Baseline; no ecchymosis, rubor or cyanosis    Patient tolerance of procedure: Tolerated well, no immediate complications             ED Course  ED Course as of Feb 08 0032   Eddye Ion Feb 07, 2021 2102 Reviewed xrays and discussed with pt - showed pt fracture of fibula  Discussed in great detail splinting and may need surgical repair  Also discussed BP and MITCHELL  Encouraged pt to stay for admission as he would be able to be connected with PCP, ortho and have BP corrected  Pt agrees for admission  2103 Pt states morphine has helped with pain  BP remains elevated  230/118 after morphine and 1 10mg dose labetolol  Will repeat with 2nd 10 mg dose  2118 /122 after #2 labetolol dose  Discussed case with Dr No Haskins who states to give hydralazine  Renal impairment not defined per epocretes- will give 10 mg IV hydralazine       2128 Troponin I: <0 02   2134 BP after labetolol #2  177/103 - will hold off on hydralazine at this time - don't want to drop too fast  [place] text to [address] for admission  Splint applied CMS intact  2141 SLIM will admit for admission  [place] text to ortho to make aware of pt         2159 CXR - wide mediastinum with cardiomegaly  Pt has no symptoms of chest pain, back pain or signs of dissection  Waiting on rad read  Pt does have elevated BP  SBIRT 20yo+      Most Recent Value   SBIRT (24 yo +)   In order to provide better care to our patients, we are screening all of our patients for alcohol and drug use  Would it be okay to ask you these screening questions? Yes Filed at: 02/07/2021 1955   Initial Alcohol Screen: US AUDIT-C    1  How often do you have a drink containing alcohol?  0 Filed at: 02/07/2021 1955   2  How many drinks containing alcohol do you have on a typical day you are drinking? 0 Filed at: 02/07/2021 1955   3a  Male UNDER 65: How often do you have five or more drinks on one occasion? 0 Filed at: 02/07/2021 1955   3b  FEMALE Any Age, or MALE 65+:  How often do you have 4 or more drinks on one occassion? 0 Filed at: 02/07/2021 1955   Audit-C Score  0 Filed at: 02/07/2021 1955   OLI: How many times in the past year have you    Used an illegal drug or used a prescription medication for non-medical reasons? Never Filed at: 02/07/2021 1955                    MDM  Number of Diagnoses or Management Options  MITCHELL (acute kidney injury) ([place] [id number] ): Hypertensive crisis:   Diagnosis management comments: Right lower leg injury -ankle and possibly knee  Hypertensive crisis    DDX:  Fracture ankle, tib-fib, knee  ST injury  HTN crisis - uncontrolled BP   Non compliance with medication     Plan:  Labs, EKG  Cardiac monitoring, IV  Xray tib/fib, ankle  IV Morphine  Monitor reassess  May need CT LLE if xray negative  Amount and/or Complexity of Data Reviewed  Clinical lab tests: ordered and reviewed  Tests in the radiology section of CPT®: ordered and reviewed  Obtain history from someone other than the patient: yes (Female friend at bedside )  Review and summarize past medical records: yes  Independent visualization of images, tracings, or specimens: yes        Disposition  Final diagnoses:   Hypertensive crisis   MITCHELL (acute kidney injury) ([place] [id number] )   Fibula fracture   Fall, initial encounter   Anemia, unspecified type     Time reflects when diagnosis was documented in both MDM as applicable and the Disposition within this note     Time User Action Codes Description Comment    2/7/2021  8:27 PM Salma Avila [I16 9] Hypertensive crisis     2/7/2021  8:27 PM Bartholome [place] Add [N17 9] MITCHELL (acute kidney injury) ([place] [id number] )     2/7/2021  8:56 PM Bartholome Rocco Add [S82 409A] Fibula fracture     2/7/2021  8:56 PM Bartholome [place] Add [place]  CTBE] Fall, initial encounter     2/7/2021  9:35 PM Bartholome Rocco Add [D64 9] Anemia, unspecified type       ED Disposition     ED Disposition Condition Date/Time Comment    Admit Triston Hauser Feb 7, 2021  9:43 PM Case was discussed with PATRICA  and the patient's admission status was agreed to be Admission Status: observation status to the service of Dr Marco Wong   Follow-up Information    None         Current Discharge Medication List      CONTINUE these medications which have NOT CHANGED    Details   hydrochlorothiazide (HYDRODIURIL) 25 mg tablet Take 1 tablet (25 mg total) by mouth daily  Qty: 20 tablet, Refills: 0    Associated Diagnoses: Contact dermatitis; Hypertension           No discharge procedures on file      PDMP Review     None          ED Provider  Electronically Signed by           Lakshmi Alejandro  02/08/21 [id number]

## 2021-02-08 NOTE — PHYSICAL THERAPY NOTE
PT EVALUATION 14:35-15:05    53 y o     [id number]    Hypertensive crisis [I16 9]  Ankle injury [S99 919A]  Fibula fracture [S82 409A]  MITCHELL (acute kidney injury) ([facility] [address] ) [N17 9]  Fall, initial encounter [W19  XXXA]  Anemia, unspecified type [D64 9]    Past Medical History:   Diagnosis Date    Hypertension          Past Surgical History:   Procedure Laterality Date    MANDIBLE FRACTURE SURGERY          02/08/21 1505   PT Last Visit   PT Visit Date 02/08/21   Note Type   Note type Evaluation  (and treatment )   Pain Assessment   Pain Score Worst Possible Pain   Home Living   Type of Home House   Home Layout Two level;Bed/bath upstairs  (0 EVELIN)   Bathroom Shower/Tub Tub/shower unit   Bathroom Toilet Standard   Bathroom Accessibility Accessible   Home Equipment   (none)   Additional Comments resides with girlfriend in 2 story home  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[employer], 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  Prior Function   Level of [place] Independent with ADLs and functional mobility   Lives With Significant other   Receives Help From Other (Comment)  (s/o)   ADL Assistance Independent   IADLs Independent   Falls in the last 6 months 1 to 4  (1 slip and fall leading to admission )   Vocational Full time employment   Comments I PTA without AD use  Active  Restrictions/Precautions   Weight Bearing Precautions Per Order Yes   RLE Weight Bearing Per Order NWB   Braces or Orthoses CAM Boot  (R foot)   Other Precautions Multiple lines; Fall Risk;Pain  (Masimo monitoring )   General   Additional Pertinent History Pt is 52 y/o male admitted p slip and fall on black ice   + fibular fx  NWB  Cam walker to be fit-copmpleted at beside prior to evaluation  Family/Caregiver Present No   Cognition   Overall Cognitive Status WFL   Arousal/Participation Alert   Orientation Level Oriented X4   Following Commands Follows all commands and directions without difficulty   Comments Pleasant     RUE Assessment   RUE Assessment [facility] LUE Assessment   LUE Assessment WFL   RLE Assessment   RLE Assessment X  (hip and knee at least 3/5  Ankle in cam boot-N/T)   LLE Assessment   LLE Assessment WFL   Coordination   Movements are Fluid and Coordinated 1   Bed Mobility   Supine to Sit 5  Supervision   Additional items Increased time required; Bedrails;HOB elevated   Sit to Supine 5  Supervision   Additional items Increased time required;Verbal cues   Additional Comments Increased time to complete  Pain with dependent position  Transfers   Sit to Stand 5  Supervision   Additional items Increased time required;Verbal cues   Stand to Sit 5  Supervision   Additional items Increased time required;Verbal cues   Additional Comments cues for hand and RLE foot position to assure NWB with transition  Ambulation/Elevation   Gait pattern Decreased foot clearance; Improper Weight shift; Short stride  (hop to pattern )   Gait Assistance 4  Minimal assist   Additional items Assist x 1   Assistive Device Rolling walker   Distance 5'x1  Limited as BP noted to be 203/125 on Masimo  Recheck manual 208/128   Stair Management Assistance Not tested   Stair Management Technique Other (Comment)  (provided written handout on technique with crutch use )   Balance   Static Sitting Good   Dynamic Sitting Fair +   Static Standing Fair -   Dynamic Standing Poor +   Ambulatory Poor +   Endurance Deficit   Endurance Deficit Yes   Endurance Deficit Description BP elevated  Manual 208/128 and return to bed 208/130  Ana Laura notified and present  Activity Tolerance   Activity Tolerance Treatment limited secondary to medical complications (Comment); Patient limited by pain   Medical Staff Made Aware NurseMallika at bedside  Nurse Made Aware yes   Assessment   Prognosis Good   Problem List Decreased strength;Decreased range of motion;Decreased endurance; Impaired balance;Decreased mobility;Orthopedic restrictions;Pain   Assessment Alem Deysi is a 53 y o  male with past medical history of hypertension not currently on any medications presents with mechanical fall  Slip and fall on ice with RLE pain   + R fibular fx  Per orthopedics to be NWB and fit with cam walker boot  PT fit with cam walker prior to evaluation completion  PT consulted for mobility training  Noted to also be in hypertensive urgency requiring continued inpatient medical management of BP  Prior to admission resides with girlfriend in 2 story home  Independent without AD use  Works full time and active  Currently presents with functional limitations related to increased RLE pain, edema, ankle ROM and strength limitations with presence of fx, NWB restrictions  Activity tolerance limited given elevated BP  Is S for bed mobility and transfers  Ambulation limited to 5 ft of distances with RW support and Arvin  Hop to pattern and maintains 100% compliance with NWB status  Activity limited given BP found to be 208/128 as reported dizziness with activity  Returned to supine and BP still 208/130  Notified nursing  Elevated RLE in supine  Provided written handout on stair management and verbal review of same  Will continue to benefit from skilled IPPT in order to progress and optimize functional outcomes as well as comply with NWB status during all mobility  Anticipate ability to progress and achieve goals for safe transition to home  Will benefit from RW and [facility] for home use  The patient's AM-PAC Basic Mobility Inpatient Short Form Raw Score is 19, Standardized Score is 42 48  A standardized score less than 42 9 suggests the patient may benefit from discharge to post-acute rehabilitation services  Please also refer to the recommendation of the Physical Therapist for safe discharge planning  Despite current AM PAC score, anticipate as BP improves and able to progress with functional mobility while inpatient, will have ability to achieve IPPT goals for d/c to home with family support    Needs  and BSC  PT will follow and progress  Barriers to Discharge Inaccessible home environment   Barriers to Discharge Comments stairs   Goals   Patient Goals get better and go home ,   STG Expiration Date 02/18/21   Short Term Goal #1 10 days: 1)  Pt will perform bed mobility with Suresh demonstrating appropriate technique 100% of the time in order to improve function  2)  Perform all transfers with Suresh demonstrating safe and appropriate technique 100% of the time in order to improve ability to negotiate safely in home environment  3) Amb with least restrictive AD > 100'x1 with mod I in order to demonstrate ability to negotiate in home environment  4)  Improve overall strength and balance 1/2 grade in order to optimize ability to perform functional tasks and reduce fall risk  5) Increase activity tolerance to 30 minutes in order to improve endurance to functional tasks  6)  Negotiate stairs using most appropriate technique and Arvin in order to be able to negotiate safely in home environment  7) PT for ongoing patient and family/caregiver education, DME needs and d/c planning in order to promote highest level of function in least restrictive environment  Plan   Treatment/Interventions LE strengthening/ROM; Functional transfer training;Elevations; Therapeutic exercise; Endurance training;Patient/family training;Equipment eval/education; Bed mobility;Gait training; Compensatory technique education;Continued evaluation;Spoke to nursing;Spoke to advanced practitioner   PT Frequency Other (Comment)  (4-6x/wk)   Recommendation   PT Discharge Recommendation Return to previous environment with social support   Equipment Recommended Lydia Pichardo; Other (Comment)  (RW, BSC)   PT - OK to Discharge No  (to acheive mobility goals  )   AM-PAC Basic Mobility Inpatient   Turning in Bed Without Bedrails 4   Lying on Back to Sitting on Edge of Flat Bed 4   Moving Bed to Chair 3   Standing Up From Chair 3   Walk in Room 3   Climb 3-5 Stairs 2   Basic Mobility Inpatient Raw Score 19   Basic Mobility Standardized Score 42 48     History: HTN urgency, fall risk, use of assistive device, NWB status, stairs, pain  Exam: impairments in locomotion, musculoskeletal, balance,posture, joint integrity, cardiac  Clinical: unstable/unpredictable ( /128, pain, NWB status, more restrictive AD, management of BP and pain)  Complexity:high    Rey Marques, PT

## 2021-02-08 NOTE — UTILIZATION REVIEW
Initial Clinical Review    Admission: Date/Time/Statement:   Admission Orders (From admission, onward)     Ordered        02/07/21 2144  Place in Observation  Once                   Orders Placed This Encounter   Procedures    Place in Observation     Standing Status:   Standing     Number of Occurrences:   1     Order Specific Question:   Level of Care     Answer:   Med Surg [16]     ED Arrival Information     Expected Arrival Acuity Means of Arrival Escorted By Service Admission Type    - 2/7/2021 18:56 Urgent Walk-In Friend Hospitalist Urgent    Arrival Complaint    fall - ankle injury        Chief Complaint   Patient presents with    Knee Injury     Patient reports that he fell on to black ice this morning, his right knee broke his fall, now pain, swelling and bruising  Patient is hypertensive in triage and he states that he should be taking medications for his HTN but does not  Assessment/Plan: 53year old male to the ED from home with complaints of right knee pain after falling on ice  Admitted under observation for MITCHELL, fracture right fibula, hypertensive urgency  H/O hypertension but does not take meds as directed  He arrives with swelling, tenderness, edema to  Right leg  Swelling from calf to toes  + pedal pulse  SPlint placed in ED  Ortho consult  Unknown crea baseline  Given IV morphine and 2 doses of iv labetolol in the ED with slightly improved bp  Check CK to rule out rhabdo  Continue with IV fluids  NPO at MN  CXR shows cardiomegaly  Start on amlodipine     ED Triage Vitals [02/07/21 1859]   Temperature Pulse Respirations Blood Pressure SpO2   99 4 °F (37 4 °C) 94 20 (!) 220/141 96 %      Temp Source Heart Rate Source Patient Position - Orthostatic VS BP Location FiO2 (%)   Oral Monitor Sitting Right arm --      Pain Score       Worst Possible Pain          Wt Readings from Last 1 Encounters:   04/30/19 88 9 kg (195 lb 15 8 oz)     Additional Vital Signs:   Date/Time  Temp  Pulse Resp  BP  MAP (mmHg)  SpO2  O2 Device Patient Position - Orthostatic VS   02/08/21 07:43:10  --  66  --  178/108Abnormal   131  97 %  -- --   02/08/21 05:36:20  --  --  --  177/108Abnormal   131  --  -- Lying   02/08/21 03:06:12  --  69  --  163/100  121  98 %  -- --   02/08/21 02:14:16  --  60  --  191/113Abnormal   139  97 %  -- Lying   02/08/21 01:07:45  --  63  --  202/126Abnormal   151  98 %  -- --   02/08/21 00:19:37  98 °F (36 7 °C)  61  20  202/129Abnormal   153  98 %  None (Room air) Lying   02/07/21 2300  --  64  18  205/112Abnormal   151  99 %  None (Room air) --   02/07/21 2130  --  70  20  177/103Abnormal   135  --  -- --   02/07/21 2115  --  73  --  215/122Abnormal   159  --  -- --   02/07/21 2100  --  --  --  235/137Abnormal   176  --  -- --   02/07/21 2037  --  77  18  230/118Abnormal   --  --  -- --   02/07/21 2024  --  77  18  251/134Abnormal   --  97 %  None (Room air) --   02/07/21 1953  --  89  --  245/137Abnormal   --  --  -- --   02/07/21 1859  99 4 °F (37 4 °C)  94  20  220/141Abnormal               Pertinent Labs/Diagnostic Test Results:   2/8 CXR:    No acute cardiopulmonary disease   Cardiomegaly  2/8 Xray right tib/fib:  Mildly displaced proximal right fibular shaft fracture         Results from last 7 days   Lab Units 02/08/21  0548 02/07/21 1954   WBC Thousand/uL 5 83 6 15   HEMOGLOBIN g/dL 10 5* 11 5*   HEMATOCRIT % 33 0* 36 9   PLATELETS Thousands/uL 223 262   NEUTROS ABS Thousands/µL  --  4 12         Results from last 7 days   Lab Units 02/08/21  0548 02/07/21 1954   SODIUM mmol/L 139 139   POTASSIUM mmol/L 3 9 3 8   CHLORIDE mmol/L 105 104   CO2 mmol/L 24 28   ANION GAP mmol/L 10 7   BUN mg/dL 15 17   CREATININE mg/dL 1 38* 1 72*   EGFR ml/min/1 73sq m 69 53   CALCIUM mg/dL 8 4 9 0     Results from last 7 days   Lab Units 02/07/21 1954   AST U/L 22   ALT U/L 24   ALK PHOS U/L 71   TOTAL PROTEIN g/dL 8 4*   ALBUMIN g/dL 4 0   TOTAL BILIRUBIN mg/dL 0 33         Results from last 7 days   Lab Units 02/08/21  0548 02/07/21 1954   GLUCOSE RANDOM mg/dL 95 111       Results from last 7 days   Lab Units 02/07/21 1954   CK TOTAL U/L 467*   CK MB INDEX % <1 0   CK MB ng/mL 1 0     Results from last 7 days   Lab Units 02/07/21 2016   TROPONIN I ng/mL <0 02         Results from last 7 days   Lab Units 02/07/21 1954   PROTIME seconds 13 9   INR  1 09   PTT seconds 28       Results from last 7 days   Lab Units 02/08/21  0559   CLARITY UA  Clear   COLOR UA  Yellow   SPEC GRAV UA  >=1 030   PH UA  5 5   GLUCOSE UA mg/dl Negative   KETONES UA mg/dl Negative   BLOOD UA  Negative   PROTEIN UA mg/dl Negative   NITRITE UA  Negative   BILIRUBIN UA  Negative   UROBILINOGEN UA E U /dl 0 2   LEUKOCYTES UA  Negative   WBC UA /hpf 0-1*   RBC UA /hpf None Seen   BACTERIA UA /hpf Occasional   EPITHELIAL CELLS WET PREP /hpf Occasional           ED Treatment:   Medication Administration from 02/07/2021 1855 to 02/08/2021 0016       Date/Time Order Dose Route Action     02/07/2021 1955 morphine injection 2 mg 2 mg Intravenous Given     02/07/2021 2037 Labetalol HCl (NORMODYNE) injection 10 mg 10 mg Intravenous Given     02/07/2021 2115 Labetalol HCl (NORMODYNE) injection 10 mg 10 mg Intravenous Given     02/07/2021 2115 sodium chloride 0 9 % infusion 75 mL/hr Intravenous New Bag     02/07/2021 2258 sodium chloride 0 9 % infusion 125 mL/hr Intravenous New Bag     02/07/2021 2259 heparin (porcine) subcutaneous injection 5,000 Units 5,000 Units Subcutaneous Given     02/07/2021 2302 HYDROmorphone (DILAUDID) injection 0 5 mg 0 5 mg Intravenous Given     02/07/2021 2259 acetaminophen (TYLENOL) tablet 975 mg 975 mg Oral Given        Past Medical History:   Diagnosis Date    Hypertension        Admitting Diagnosis: Hypertensive crisis [I16 9]  Ankle injury [S99 919A]  Fibula fracture [S82 409A]  MITCHELL (acute kidney injury) ([facility] [address] ) [N17 9]  Fall, initial encounter [W19  XXXA]  Anemia, unspecified type [D64 9]  Age/Sex: 53 y o  male  Admission Orders:  SCDS  NPO  Scheduled Medications:  acetaminophen, 975 mg, Oral, Q8H [address]  amLODIPine, 5 mg, Oral, Daily  docusate sodium, 100 mg, Oral, BID  heparin (porcine), 5,000 Units, Subcutaneous, Q8H MELANIE      Continuous IV Infusions:  sodium chloride, 125 mL/hr, Intravenous, Continuous      PRN Meds:  HYDROmorphone, 0 5 mg, Intravenous, Q1H PRN x2  hydrOXYzine HCL, 25 mg, Oral, Q6H PRN  Labetalol HCl, 10 mg, Intravenous, Q6H PRN x1  oxyCODONE, 10 mg, Oral, Q4H PRN x2  oxyCODONE, 5 mg, Oral, Q4H PRN        IP CONSULT TO ORTHOPEDIC SURGERY  IP CONSULT TO CASE MANAGEMENT    Network Utilization Review Department  ATTENTION: Please call with any questions or concerns to [phone] and carefully listen to the prompts so that you are directed to the right person  All voicemails are confidential   Lauren Rowell all requests for admission clinical reviews, approved or denied determinations and any other requests to dedicated fax number below belonging to the campus where the patient is receiving treatment   List of dedicated fax numbers for the Facilities:  [address] DENIALS (Administrative/Medical Necessity) [phone]   [address] (Maternity/NICU/Pediatrics) [phone]   [address] Padma Nelson [address] ([address]) [address] [place] [address] [address] [place] [address] [phone]

## 2021-02-08 NOTE — ASSESSMENT & PLAN NOTE
Unknown baseline creatinine  Likely pre renal as patient did report alcohol use over the weekend and little oral hydration  Possibly underlying CKD secondary to chronic hypertensive nephrosclerosis  POA Cr 1 7 --> 1 38   BMP in AM

## 2021-02-08 NOTE — PROGRESS NOTES
Progress Note - Shiloh Points 1970, 53 y o  male MRN: [id number]  Unit/Bed#: [address] 2 -01 Encounter: [id number]  Primary Care Provider: No primary care provider on file  Date and time admitted to hospital: 2/7/2021  7:19 PM    * Fracture of right fibula  Assessment & Plan  Mechanical fall on black ice with Right fibula fracture  Orthopedics consultation appreciated   Conservative treatment   Cam walker boot   PT consulted   Non-weightbearing RLE with crutches or walker   Pain control   Follow up with Dr Alexandrea Diaz in 1 week for repeat xrays     Hypertensive urgency  Assessment & Plan  Likely in setting of underlying uncontrolled hypertension  Had been on hydrochlorothiazide in the past but has not taken in over a year  Systolic Blood pressures as high as 245 in emergency department  Blood pressure checked manually today now 162/98, slowly lowering   Started on norvasc 5 mg once daily   Will likely add HCTZ 12 5 mg daily tomorrow   IV labetalol prn   Close follow up with PCP outpatient   Low sodium diet education provided         MITCHELL (acute kidney injury) ([facility] [address] )  Assessment & Plan  Unknown baseline creatinine  Likely pre renal as patient did report alcohol use over the weekend and little oral hydration  Possibly underlying CKD secondary to chronic hypertensive nephrosclerosis  POA Cr 1 7 --> 1 38   BMP in AM         Normocytic anemia  Assessment & Plan  No active signs of bleeding   Outpatient workup with PCP       VTE Pharmacologic Prophylaxis:   Pharmacologic: Heparin  Mechanical VTE Prophylaxis in Place: No    Patient Centered Rounds: I have performed bedside rounds with nursing staff today  Discussions with Specialists or Other Care Team Provider:     Education and Discussions with Family / Patient: patient at the bedside     Time Spent for Care: 20 minutes  More than 50% of total time spent on counseling and coordination of care as described above      Current Length of Stay: 0 day(s)    Current Patient Status: Observation   Certification Statement: The patient will continue to require additional inpatient hospital stay due to hypertensive urgency     Discharge Plan: in 24 hours     Code Status: Level 1 - Full Code      Subjective:   Patient doing well  Was very eager to go home today but is agreeable to staying  o acute complaints  Objective:     Vitals:   Temp (24hrs), Av 7 °F (37 1 °C), Min:98 °F (36 7 °C), Max:99 4 °F (37 4 °C)    Temp:  [98 °F (36 7 °C)-99 4 °F (37 4 °C)] 98 °F (36 7 °C)  HR:  [60-94] 68  Resp:  [18-20] 20  BP: (162-251)/() 162/98  SpO2:  [96 %-99 %] 98 %  There is no height or weight on file to calculate BMI  Input and Output Summary (last 24 hours): Intake/Output Summary (Last 24 hours) at 2021 1317  Last data filed at 2021 2250  Gross per 24 hour   Intake 100 ml   Output --   Net 100 ml       Physical Exam:     Physical Exam  Vitals signs and nursing note reviewed  HENT:      Head: Normocephalic  Eyes:      Conjunctiva/sclera: Conjunctivae normal    Cardiovascular:      Rate and Rhythm: Normal rate and regular rhythm  Pulmonary:      Effort: Pulmonary effort is normal       Breath sounds: Normal breath sounds  Abdominal:      General: Bowel sounds are normal       Palpations: Abdomen is soft  Musculoskeletal:         General: Deformity (ACE dressing over RLE) present  Skin:     General: Skin is warm  Neurological:      Mental Status: He is alert and oriented to person, place, and time     Psychiatric:         Mood and Affect: Mood normal            Additional Data:     Labs:    Results from last 7 days   Lab Units 2148 21   WBC Thousand/uL 5 83 6 15   HEMOGLOBIN g/dL 10 5* 11 5*   HEMATOCRIT % 33 0* 36 9   PLATELETS Thousands/uL 223 262   NEUTROS PCT %  --  68   LYMPHS PCT %  --  24   MONOS PCT %  --  8   EOS PCT %  --  0     Results from last 7 days   Lab Units 21  0548 21   SODIUM mmol/L 139 139 POTASSIUM mmol/L 3 9 3 8   CHLORIDE mmol/L 105 104   CO2 mmol/L 24 28   BUN mg/dL 15 17   CREATININE mg/dL 1 38* 1 72*   ANION GAP mmol/L 10 7   CALCIUM mg/dL 8 4 9 0   ALBUMIN g/dL  --  4 0   TOTAL BILIRUBIN mg/dL  --  0 33   ALK PHOS U/L  --  71   ALT U/L  --  24   AST U/L  --  22   GLUCOSE RANDOM mg/dL 95 111     Results from last 7 days   Lab Units 02/07/21 1954   INR  1 09                       * I Have Reviewed All Lab Data Listed Above  * Additional Pertinent Lab Tests Reviewed: All Labs Within Last 24 Hours Reviewed    Imaging:    Imaging Reports Reviewed Today Include: reviewed  Imaging Personally Reviewed by Myself Includes:      Recent Cultures (last 7 days):           Last 24 Hours Medication List:   Current Facility-Administered Medications   Medication Dose Route Frequency Provider Last Rate    acetaminophen  975 mg Oral [facility] Lovina Pouch, DO      amLODIPine  5 mg Oral Daily MUSA Amezquita      docusate sodium  100 mg Oral BID Lovina Pouch, DO      heparin (porcine)  5,000 Units Subcutaneous Q8H [address], DO      [START ON 2/9/2021] hydrochlorothiazide  12 5 mg Oral Daily Princess Rubén PA-C      HYDROmorphone  0 5 mg Intravenous Q1H PRN Lovina Pouch, DO      hydrOXYzine HCL  25 mg Oral Q6H PRN Lovina Pouch, DO      Labetalol HCl  10 mg Intravenous Q6H PRN Princess Rubén PA-C      oxyCODONE  10 mg Oral Q4H PRN Lovina Pouch, DO      oxyCODONE  5 mg Oral Q4H PRN Lovina Pouch, DO          Today, Patient Was Seen By: Princess Rubén PA-C    ** Please Note: Dictation voice to text software may have been used in the creation of this document   **

## 2021-02-08 NOTE — ASSESSMENT & PLAN NOTE
Likely in setting of underlying uncontrolled hypertension  Had been on hydrochlorothiazide in the past but has not taken in over a year  Systolic Blood pressures as high as 245 in emergency department  Chest x-ray with cardiomegaly  EKG looks consistent with LVH  Will attempt to slowly lower blood pressure goal approximately 180 in next 24 hours  Will start amlodipine 5 milligrams daily  Labetalol p r n

## 2021-02-08 NOTE — ASSESSMENT & PLAN NOTE
Likely in setting of underlying uncontrolled hypertension  Had been on hydrochlorothiazide in the past but has not taken in over a year  Systolic Blood pressures as high as 245 in emergency department  Blood pressure checked manually today now 162/98, slowly lowering   Started on norvasc 5 mg once daily   Will likely add HCTZ 12 5 mg daily tomorrow   IV labetalol prn   Close follow up with PCP outpatient   Low sodium diet education provided

## 2021-02-08 NOTE — H&P
H&P- Carmen Groves 1970, 53 y o  male MRN: [id number]    Unit/Bed#: ED 21 Encounter: [id number]    Primary Care Provider: No primary care provider on file  Date and time admitted to hospital: 2/7/2021  7:19 PM        MITCHELL (acute kidney injury) [facility])  Assessment & Plan  Unknown baseline creatinine  Likely pre renal as patient did report alcohol use over the weekend and little oral hydration  Possibly secondary to chronic hypertensive nephrosclerosis  Will check CK to rule out rhabdo  Check urinalysis   Continue IV fluids overnight      Hypertensive urgency  Assessment & Plan  Likely in setting of underlying uncontrolled hypertension  Had been on hydrochlorothiazide in the past but has not taken in over a year  Systolic Blood pressures as high as 245 in emergency department  Chest x-ray with cardiomegaly  EKG looks consistent with LVH  Will attempt to slowly lower blood pressure goal approximately 180 in next 24 hours  Will start amlodipine 5 milligrams daily  Labetalol p r n       * Fracture of right fibula  Assessment & Plan  Mechanical fall on black ice  Right fibula fracture  Patient with intact sensation, distal pulses  Orthopedics consultation  NPO at midnight    VTE Prophylaxis: Heparin  / sequential compression device   Code Status:  Level 1 full code  POLST: POLST form is not discussed and not completed at this time  Discussion with family:  Spouse present at the bedside    Anticipated Length of Stay:  Patient will be admitted on an Observation basis with an anticipated length of stay of  less than 2 midnights  Justification for Hospital Stay:  Need for IV medications to manage blood pressure, IV fluids for MITCHELL    Total Time for Visit, including Counseling / Coordination of Care: 1 hour  Greater than 50% of this total time spent on direct patient counseling and coordination of care      Chief Complaint:   Mechanical fall    History of Present Illness:    Carmen Groves is a 53 y o  male with past medical history of hypertension not currently on any medications presents with mechanical fall  Patient was walking outside and slipped on black ice and immediately noticed swelling in his right lower extremity and heard a crack  Patient was found to have fibula fracture  Emergency department patient's blood pressure greater than 240  Patient's lower extremity was splinted  At the bedside patient appears comfortable  He reports that his pain is controlled he is denying chest pain, shortness of breath, palpitations, abdominal pain, nausea, vomiting  Reports that he was on hydrochlorothiazide and has not taken it in over a year  Family history of unknown cancer and hypertension  Patient does not smoke tobacco, did drink heavily over the weekend but is not a consistent alcohol user, no drug use  Review of Systems:    Review of Systems   Constitutional: Negative for chills and fever  Eyes: Negative for photophobia and visual disturbance  Respiratory: Negative for shortness of breath and wheezing  Cardiovascular: Negative for chest pain and palpitations  Gastrointestinal: Negative for constipation, diarrhea, nausea and vomiting  Genitourinary: Negative for difficulty urinating and dysuria  Skin: Negative for rash and wound  Neurological: Negative for dizziness, light-headedness and headaches  Psychiatric/Behavioral: The patient is nervous/anxious  Past Medical and Surgical History:     Past Medical History:   Diagnosis Date    Hypertension        Past Surgical History:   Procedure Laterality Date    MANDIBLE FRACTURE SURGERY         Meds/Allergies:    Prior to Admission medications    Medication Sig Start Date End Date Taking?  Authorizing Provider   hydrochlorothiazide (HYDRODIURIL) 25 mg tablet Take 1 tablet (25 mg total) by mouth daily  Patient not taking: Reported on 2/7/2021 4/30/19   Gregory Hunter PA-C   hydrocortisone 2 5 % lotion Apply topically 2 (two) times a day 4/30/19 2/7/21  Nani Marsh PA-C   permethrin (ELIMITE) 5 % cream Apply neck to toes every other day x 1 week  Then apply 2 x week until cured  Patient not taking: Reported on 4/30/2019 2/5/18 2/7/21  Jordy Gutiérrez DO     I have reviewed home medications with patient personally  Allergies: No Known Allergies    Social History:     Marital Status: Single   Substance Use History:   Social History     Substance and Sexual Activity   Alcohol Use Not Currently    Comment: Socially     Social History     Tobacco Use   Smoking Status Never Smoker   Smokeless Tobacco Never Used     Social History     Substance and Sexual Activity   Drug Use Yes    Types: Marijuana    Comment: THC occasionally       Family History:    History reviewed  No pertinent family history  Physical Exam:     Vitals:   Blood Pressure: (!) 177/103 (02/07/21 2130)  Pulse: 70 (02/07/21 2130)  Temperature: 99 4 °F (37 4 °C) (02/07/21 1859)  Temp Source: Oral (02/07/21 1859)  Respirations: 20 (02/07/21 2130)  SpO2: 97 % (02/07/21 2024)    Physical Exam  Constitutional:       General: He is not in acute distress  Appearance: He is well-developed  He is not diaphoretic  HENT:      Head: Normocephalic and atraumatic  Mouth/Throat:      Pharynx: No oropharyngeal exudate  Eyes:      General: No scleral icterus  Conjunctiva/sclera: Conjunctivae normal       Pupils: Pupils are equal, round, and reactive to light  Cardiovascular:      Rate and Rhythm: Normal rate and regular rhythm  Heart sounds: Normal heart sounds  No murmur  Pulmonary:      Effort: Pulmonary effort is normal  No respiratory distress  Breath sounds: Normal breath sounds  No wheezing or rales  Abdominal:      General: Bowel sounds are normal  There is no distension  Palpations: Abdomen is soft  Tenderness: There is no abdominal tenderness  There is no guarding     Musculoskeletal:      Comments: Right lower extremity in splint Skin:     General: Skin is warm and dry  Capillary Refill: Capillary refill takes less than 2 seconds  Neurological:      Mental Status: He is alert and oriented to person, place, and time  Psychiatric:         Behavior: Behavior normal          Thought Content: Thought content normal          Judgment: Judgment normal           Additional Data:     Lab Results: I have reviewed pertinent results     Results from last 7 days   Lab Units 02/07/21 1954   WBC Thousand/uL 6 15   HEMOGLOBIN g/dL 11 5*   HEMATOCRIT % 36 9   PLATELETS Thousands/uL 262   NEUTROS PCT % 68   LYMPHS PCT % 24   MONOS PCT % 8   EOS PCT % 0     Results from last 7 days   Lab Units 02/07/21 1954   SODIUM mmol/L 139   POTASSIUM mmol/L 3 8   CHLORIDE mmol/L 104   CO2 mmol/L 28   BUN mg/dL 17   CREATININE mg/dL 1 72*   ANION GAP mmol/L 7   CALCIUM mg/dL 9 0   ALBUMIN g/dL 4 0   TOTAL BILIRUBIN mg/dL 0 33   ALK PHOS U/L 71   ALT U/L 24   AST U/L 22   GLUCOSE RANDOM mg/dL 111     Results from last 7 days   Lab Units 02/07/21 1954   INR  1 09                   Imaging: I have reviewed pertinent imaging     XR chest 1 view portable   ED Interpretation by MUSA Damian (02/07 2159)   Preliminary reading  Wide mediastinum with cardiomegaly  Waiting on rad read       XR ankle 3+ views RIGHT   ED Interpretation by MUSA Damian (02/07 2030)   Preliminary reading  No acute process seen  Waiting on rad read       XR tibia fibula 2 views RIGHT   ED Interpretation by Diego Mallory, [address] (02/07 2055)   Preliminary reading  + proximal fibula fracture shaft  Waiting on rad read           EKG, Pathology, and Other Studies Reviewed on Admission:   · EKG: Reviewed     Allscripts / Epic Records Reviewed: Yes     ** Please Note: This note has been constructed using a voice recognition system   **

## 2021-02-08 NOTE — ASSESSMENT & PLAN NOTE
Mechanical fall on black ice  Right fibula fracture  Patient with intact sensation, distal pulses  Orthopedics consultation  NPO at midnight

## 2021-02-08 NOTE — CASE MANAGEMENT
Enrique 13 electronically notified of need for new pt/TCM appointment with information placed on AVS   Noted PT recommendation for RW/BSC with pt being MA pending- search [facility]ba initiated as likely will need assistance in obtaining same  Will continue to follow to assist with dc poc

## 2021-02-08 NOTE — PLAN OF CARE
Problem: Potential for Falls  Goal: Patient will remain free of falls  Description: INTERVENTIONS:  - Assess patient frequently for physical needs  -  Identify cognitive and physical deficits and behaviors that affect risk of falls    -  [place] fall precautions as indicated by assessment   - Educate patient/family on patient safety including physical limitations  - Instruct patient to call for assistance with activity based on assessment  - Modify environment to reduce risk of injury  - Consider OT/PT consult to assist with strengthening/mobility  2/8/2021 0200 by Eda Manriquez RN  Outcome: Progressing  2/8/2021 0158 by Eda Manriquez RN  Outcome: Progressing     Problem: Prexisting or High Potential for Compromised Skin Integrity  Goal: Skin integrity is maintained or improved  Description: INTERVENTIONS:  - Identify patients at risk for skin breakdown  - Assess and monitor skin integrity  - Assess and monitor nutrition and hydration status  - Monitor labs   - Assess for incontinence   - Turn and reposition patient  - Assist with mobility/ambulation  - Relieve pressure over bony prominences  - Avoid friction and shearing  - Provide appropriate hygiene as needed including keeping skin clean and dry  - Evaluate need for skin moisturizer/barrier cream  - Collaborate with interdisciplinary team   - Patient/family teaching  - Consider wound care consult   2/8/2021 0200 by Eda Manriquez RN  Outcome: Progressing  2/8/2021 0158 by Eda Manriquez RN  Outcome: Progressing     Problem: PAIN - ADULT  Goal: Verbalizes/displays adequate comfort level or baseline comfort level  Description: Interventions:  - Encourage patient to monitor pain and request assistance  - Assess pain using appropriate pain scale  - Administer analgesics based on type and severity of pain and evaluate response  - Implement non-pharmacological measures as appropriate and evaluate response  - Consider cultural and social influences on pain and pain management  - Notify physician/advanced practitioner if interventions unsuccessful or patient reports new pain  Outcome: Progressing     Problem: INFECTION - ADULT  Goal: Absence or prevention of progression during hospitalization  Description: INTERVENTIONS:  - Assess and monitor for signs and symptoms of infection  - Monitor lab/diagnostic results  - Monitor all insertion sites, i e  indwelling lines, tubes, and drains  - Monitor endotracheal if appropriate and nasal secretions for changes in amount and color  - [place] appropriate cooling/warming therapies per order  - Administer medications as ordered  - Instruct and encourage patient and family to use good hand hygiene technique  - Identify and instruct in appropriate isolation precautions for identified infection/condition  Outcome: Progressing  Goal: Absence of fever/infection during neutropenic period  Description: INTERVENTIONS:  - Monitor WBC    Outcome: Progressing     Problem: SAFETY ADULT  Goal: Patient will remain free of falls  Description: INTERVENTIONS:  - Assess patient frequently for physical needs  -  Identify cognitive and physical deficits and behaviors that affect risk of falls    -  [place] fall precautions as indicated by assessment   - Educate patient/family on patient safety including physical limitations  - Instruct patient to call for assistance with activity based on assessment  - Modify environment to reduce risk of injury  - Consider OT/PT consult to assist with strengthening/mobility  2/8/2021 0200 by Bety Bianchi RN  Outcome: Progressing  2/8/2021 0158 by Bety Bianchi RN  Outcome: Progressing  Goal: Maintain or return to baseline ADL function  Description: INTERVENTIONS:  -  Assess patient's ability to carry out ADLs; assess patient's baseline for ADL function and identify physical deficits which impact ability to perform ADLs (bathing, care of mouth/teeth, toileting, grooming, dressing, etc )  - Assess/evaluate cause of self-care deficits   - Assess range of motion  - Assess patient's mobility; develop plan if impaired  - Assess patient's need for assistive devices and provide as appropriate  - Encourage maximum independence but intervene and supervise when necessary  - Involve family in performance of ADLs  - Assess for home care needs following discharge   - Consider OT consult to assist with ADL evaluation and planning for discharge  - Provide patient education as appropriate  Outcome: Progressing  Goal: Maintain or return mobility status to optimal level  Description: INTERVENTIONS:  - Assess patient's baseline mobility status (ambulation, transfers, stairs, etc )    - Identify cognitive and physical deficits and behaviors that affect mobility  - Identify mobility aids required to assist with transfers and/or ambulation (gait belt, sit-to-stand, lift, walker, cane, etc )  - [place] fall precautions as indicated by assessment  - Record patient progress and toleration of activity level on Mobility SBAR; progress patient to next Phase/Stage  - Instruct patient to call for assistance with activity based on assessment  - Consider rehabilitation consult to assist with strengthening/weightbearing, etc   Outcome: Progressing     Problem: DISCHARGE PLANNING  Goal: Discharge to home or other facility with appropriate resources  Description: INTERVENTIONS:  - Identify barriers to discharge w/patient and caregiver  - Arrange for needed discharge resources and transportation as appropriate  - Identify discharge learning needs (meds, wound care, etc )  - Arrange for interpretive services to assist at discharge as needed  - Refer to Case Management Department for coordinating discharge planning if the patient needs post-hospital services based on physician/advanced practitioner order or complex needs related to functional status, cognitive ability, or social support system  Outcome: Progressing     Problem: Knowledge Deficit  Goal: Patient/family/caregiver demonstrates understanding of disease process, treatment plan, medications, and discharge instructions  Description: Complete learning assessment and assess knowledge base  Interventions:  - Provide teaching at level of understanding  - Provide teaching via preferred learning methods  Outcome: Progressing     Problem: NEUROSENSORY - ADULT  Goal: Achieves stable or improved neurological status  Description: INTERVENTIONS  - Monitor and report changes in neurological status  - Monitor vital signs such as temperature, blood pressure, glucose, and any other labs ordered   - Initiate measures to prevent increased intracranial pressure  - Monitor for seizure activity and implement precautions if appropriate      Outcome: Progressing  Goal: Remains free of injury related to seizures activity  Description: INTERVENTIONS  - Maintain airway, patient safety  and administer oxygen as ordered  - Monitor patient for seizure activity, document and report duration and description of seizure to physician/advanced practitioner  - If seizure occurs,  ensure patient safety during seizure  - Reorient patient post seizure  - Seizure pads on all 4 side rails  - Instruct patient/family to notify RN of any seizure activity including if an aura is experienced  - Instruct patient/family to call for assistance with activity based on nursing assessment  - Administer anti-seizure medications if ordered    Outcome: Progressing  Goal: Achieves maximal functionality and self care  Description: INTERVENTIONS  - Monitor swallowing and airway patency with patient fatigue and changes in neurological status  - Encourage and assist patient to increase activity and self care     - Encourage visually impaired, hearing impaired and aphasic patients to use assistive/communication devices  Outcome: Progressing     Problem: CARDIOVASCULAR - ADULT  Goal: Maintains optimal cardiac output and hemodynamic stability  Description: INTERVENTIONS:  - Monitor I/O, vital signs and rhythm  - Monitor for S/S and trends of decreased cardiac output  - Administer and titrate ordered vasoactive medications to optimize hemodynamic stability  - Assess quality of pulses, skin color and temperature  - Assess for signs of decreased coronary artery perfusion  - Instruct patient to report change in severity of symptoms  Outcome: Progressing  Goal: Absence of cardiac dysrhythmias or at baseline rhythm  Description: INTERVENTIONS:  - Continuous cardiac monitoring, vital signs, obtain 12 lead EKG if ordered  - Administer antiarrhythmic and heart rate control medications as ordered  - Monitor electrolytes and administer replacement therapy as ordered  Outcome: Progressing     Problem: RESPIRATORY - ADULT  Goal: Achieves optimal ventilation and oxygenation  Description: INTERVENTIONS:  - Assess for changes in respiratory status  - Assess for changes in mentation and behavior  - Position to facilitate oxygenation and minimize respiratory effort  - Oxygen administered by appropriate delivery if ordered  - Initiate smoking cessation education as indicated  - Encourage broncho-pulmonary hygiene including cough, deep breathe, Incentive Spirometry  - Assess the need for suctioning and aspirate as needed  - Assess and instruct to report SOB or any respiratory difficulty  - Respiratory Therapy support as indicated  Outcome: Progressing     Problem: GASTROINTESTINAL - ADULT  Goal: Minimal or absence of nausea and/or vomiting  Description: INTERVENTIONS:  - Administer IV fluids if ordered to ensure adequate hydration  - Maintain NPO status until nausea and vomiting are resolved  - Nasogastric tube if ordered  - Administer ordered antiemetic medications as needed  - Provide nonpharmacologic comfort measures as appropriate  - Advance diet as tolerated, if ordered  - Consider nutrition services referral to assist patient with adequate nutrition and appropriate food choices  Outcome: Progressing  Goal: Maintains or returns to baseline bowel function  Description: INTERVENTIONS:  - Assess bowel function  - Encourage oral fluids to ensure adequate hydration  - Administer IV fluids if ordered to ensure adequate hydration  - Administer ordered medications as needed  - Encourage mobilization and activity  - Consider nutritional services referral to assist patient with adequate nutrition and appropriate food choices  Outcome: Progressing  Goal: Maintains adequate nutritional intake  Description: INTERVENTIONS:  - Monitor percentage of each meal consumed  - Identify factors contributing to decreased intake, treat as appropriate  - Assist with meals as needed  - Monitor I&O, weight, and lab values if indicated  - Obtain nutrition services referral as needed  Outcome: Progressing     Problem: GENITOURINARY - ADULT  Goal: Maintains or returns to baseline urinary function  Description: INTERVENTIONS:  - Assess urinary function  - Encourage oral fluids to ensure adequate hydration if ordered  - Administer IV fluids as ordered to ensure adequate hydration  - Administer ordered medications as needed  - Offer frequent toileting  - Follow urinary retention protocol if ordered  Outcome: Progressing  Goal: Absence of urinary retention  Description: INTERVENTIONS:  - Assess patients ability to void and empty bladder  - Monitor I/O  - Bladder scan as needed  - Discuss with physician/AP medications to alleviate retention as needed  - Discuss catheterization for long term situations as appropriate  Outcome: Progressing     Problem: METABOLIC, FLUID AND ELECTROLYTES - ADULT  Goal: Electrolytes maintained within normal limits  Description: INTERVENTIONS:  - Monitor labs and assess patient for signs and symptoms of electrolyte imbalances  - Administer electrolyte replacement as ordered  - Monitor response to electrolyte replacements, including repeat lab results as appropriate  - Instruct patient on fluid and nutrition as appropriate  Outcome: Progressing  Goal: Fluid balance maintained  Description: INTERVENTIONS:  - Monitor labs   - Monitor I/O and WT  - Instruct patient on fluid and nutrition as appropriate  - Assess for signs & symptoms of volume excess or deficit  Outcome: Progressing  Goal: Glucose maintained within target range  Description: INTERVENTIONS:  - Monitor Blood Glucose as ordered  - Assess for signs and symptoms of hyperglycemia and hypoglycemia  - Administer ordered medications to maintain glucose within target range  - Assess nutritional intake and initiate nutrition service referral as needed  Outcome: Progressing     Problem: SKIN/TISSUE INTEGRITY - ADULT  Goal: Skin integrity remains intact  Description: INTERVENTIONS  - Identify patients at risk for skin breakdown  - Assess and monitor skin integrity  - Assess and monitor nutrition and hydration status  - Monitor labs (i e  albumin)  - Assess for incontinence   - Turn and reposition patient  - Assist with mobility/ambulation  - Relieve pressure over bony prominences  - Avoid friction and shearing  - Provide appropriate hygiene as needed including keeping skin clean and dry  - Evaluate need for skin moisturizer/barrier cream  - Collaborate with interdisciplinary team (i e  Nutrition, Rehabilitation, etc )   - Patient/family teaching  Outcome: Progressing  Goal: Incision(s), wounds(s) or drain site(s) healing without S/S of infection  Description: INTERVENTIONS  - Assess and document risk factors for skin impairment   - Assess and document dressing, incision, wound bed, drain sites and surrounding tissue  - Consider nutrition services referral as needed  - Oral mucous membranes remain intact  - Provide patient/ family education  Outcome: Progressing  Goal: Oral mucous membranes remain intact  Description: INTERVENTIONS  - Assess oral mucosa and hygiene practices  - Implement preventative oral hygiene regimen  - Implement oral medicated treatments as ordered  - Initiate Nutrition services referral as needed  Outcome: Progressing     Problem: HEMATOLOGIC - ADULT  Goal: Maintains hematologic stability  Description: INTERVENTIONS  - Assess for signs and symptoms of bleeding or hemorrhage  - Monitor labs  - Administer supportive blood products/factors as ordered and appropriate  Outcome: Progressing     Problem: MUSCULOSKELETAL - ADULT  Goal: Maintain or return mobility to safest level of function  Description: INTERVENTIONS:  - Assess patient's ability to carry out ADLs; assess patient's baseline for ADL function and identify physical deficits which impact ability to perform ADLs (bathing, care of mouth/teeth, toileting, grooming, dressing, etc )  - Assess/evaluate cause of self-care deficits   - Assess range of motion  - Assess patient's mobility  - Assess patient's need for assistive devices and provide as appropriate  - Encourage maximum independence but intervene and supervise when necessary  - Involve family in performance of ADLs  - Assess for home care needs following discharge   - Consider OT consult to assist with ADL evaluation and planning for discharge  - Provide patient education as appropriate  Outcome: Progressing  Goal: Maintain proper alignment of affected body part  Description: INTERVENTIONS:  - Support, maintain and protect limb and body alignment  - Provide patient/ family with appropriate education  Outcome: Progressing

## 2021-02-08 NOTE — DISCHARGE INSTRUCTIONS
You need to follow up with Orthopedic team, Dr Osmar Chi for repeat xray in 1 week to document healing of fracture  You were started on blood pressure medications  You will need to continue low sodium diet  You need to establish care with a primary care doctor for follow up of blood pressure monitoring and renal function  I recommend you repeat lab work in 2 weeks to monitor your kidney function

## 2021-02-08 NOTE — PHYSICAL THERAPY NOTE
Orthotic Note 14:20-14:35 ( 15 minutes)            Date: 2/8/2021      Patient Name: Thiago Platt            Reason for Consult:  R fibular fx  Fit with cam walker boot for NWB  Recommendations: AT bedside fit with size large AIRCAST cam walker boot  Instructed patient in strap management, use of air bladder and fit  Instructed to monitor skin integrity  Informational brochure left at bedside  All questions answered regarding cam walker boot  Instructed to maintain boot at all times       Siri Strauss, PT

## 2021-02-08 NOTE — ASSESSMENT & PLAN NOTE
Unknown baseline creatinine  Likely pre renal as patient did report alcohol use over the weekend and little oral hydration  Possibly secondary to chronic hypertensive nephrosclerosis  Will check CK to rule out rhabdo  Check urinalysis   Continue IV fluids overnight

## 2021-02-08 NOTE — PLAN OF CARE
Problem: PHYSICAL THERAPY ADULT  Goal: Performs mobility at highest level of function for planned discharge setting  See evaluation for individualized goals  Description: Treatment/Interventions: LE strengthening/ROM, Functional transfer training, Elevations, Therapeutic exercise, Endurance training, Patient/family training, Equipment eval/education, Bed mobility, Gait training, Compensatory technique education, Continued evaluation, Spoke to nursing, Spoke to advanced practitioner  Equipment Recommended: Helena Moya, Other (Comment)(RW, BSC)       See flowsheet documentation for full assessment, interventions and recommendations  Outcome: Progressing  Note: Prognosis: Good  Problem List: Decreased strength, Decreased range of motion, Decreased endurance, Impaired balance, Decreased mobility, Orthopedic restrictions, Pain  Assessment: Stepyh Rios is a 53 y o  male with past medical history of hypertension not currently on any medications presents with mechanical fall  Slip and fall on ice with RLE pain   + R fibular fx  Per orthopedics to be NWB and fit with cam walker boot  PT fit with cam walker prior to evaluation completion  PT consulted for mobility training  Noted to also be in hypertensive urgency requiring continued inpatient medical management of BP  Prior to admission resides with girlfriend in 2 story home  Independent without AD use  Works full time and active  Currently presents with functional limitations related to increased RLE pain, edema, ankle ROM and strength limitations with presence of fx, NWB restrictions  Activity tolerance limited given elevated BP  Is S for bed mobility and transfers  Ambulation limited to 5 ft of distances with RW support and Arvin  Hop to pattern and maintains 100% compliance with NWB status  Activity limited given BP found to be 208/128 as reported dizziness with activity  Returned to supine and BP still 208/130  Notified nursing    Elevated RLE in supine  Provided written handout on stair management and verbal review of same  Will continue to benefit from skilled IPPT in order to progress and optimize functional outcomes as well as comply with NWB status during all mobility  Anticipate ability to progress and achieve goals for safe transition to home  Will benefit from RW and [facility] for home use  The patient's AM-PAC Basic Mobility Inpatient Short Form Raw Score is 19, Standardized Score is 42 48  A standardized score less than 42 9 suggests the patient may benefit from discharge to post-acute rehabilitation services  Please also refer to the recommendation of the Physical Therapist for safe discharge planning  Despite current AM PAC score, anticipate as BP improves and able to progress with functional mobility while inpatient, will have ability to achieve IPPT goals for d/c to home with family support  Needs RW and [facility]  PT will follow and progress  Barriers to Discharge: Inaccessible home environment  Barriers to Discharge Comments: stairs  PT Discharge Recommendation: Return to previous environment with social support     PT - [place] to Discharge: No(to acheive mobility goals )    See flowsheet documentation for full assessment

## 2021-02-08 NOTE — ASSESSMENT & PLAN NOTE
Mechanical fall on black ice with Right fibula fracture  Orthopedics consultation appreciated   Conservative treatment   Cam walker boot   PT consulted   Non-weightbearing RLE with crutches or walker   Pain control   Follow up with Dr Michaela Fletcher in 1 week for repeat xrays

## 2021-02-08 NOTE — CONSULTS
Orthopaedic Surgery - Consultation  Dominik Milligan (67 y o  male)   : 1970   MRN: [id number]  Date: 2021   Encounter: [id number]   Unit/Bed#: [facility] 87 222-01    Consulting Physician:  Jaylon Cook PA-C  Requesting Physician: Luis Morrow MD  Reason for Consult / Principal Problem:  Mildly displaced proximal right fibular fracture    Assessment / Plan  Mildly displaced proximal right fibular fracture with slight widening the ankle mortise concerning for possible Maisonneuve fracture/injury    · Alignment the ankle mortise on x-ray was acceptable at this time  Treatment for the proximal fibular fracture will be conservative  · Patient was fitted with a Cam walker boot at this time  · He should be nonweightbearing on the right leg at this time with the use of crutches or walker  · Continue with ice and analgesics as needed with frequent elevation  · Follow-up outpatient in 1 week with Dr Bessy Baez for repeat x-rays with stress views of the right ankle  History of Present Illness  Dominik Milligan is a 53 y o  male who presents to [facility] due to a fall on ice causing him to twist his right leg  He states that he did feel a crack when he fell and immediate pain in both his proximal lateral aspect of his leg and ankle  At this time he is splinted from the emergency room and is resting comfortably in bed  He states that most of his pain is in area of the proximal fibula fracture at this time  He is denying any distal numbness and tingling at this time  He states that the splint is tolerable at this time  His admission was due to a hypertensive urgency episode yesterday  Review of Systems  Negative for chest pain and shortness of breath    Medical, Surgical, Family, and Social History    Past Medical History:   Diagnosis Date    Hypertension        Past Surgical History:   Procedure Laterality Date    MANDIBLE FRACTURE SURGERY         History reviewed   No pertinent family history  Social History     Occupational History    Not on file   Tobacco Use    Smoking status: Never Smoker    Smokeless tobacco: Never Used   Substance and Sexual Activity    Alcohol use: Not Currently     Comment: Socially    Drug use: Yes     Types: Marijuana     Comment: THC occasionally    Sexual activity: Not on file       No Known Allergies    Current Facility-Administered Medications   Medication Dose Route Frequency    acetaminophen (TYLENOL) tablet 975 mg  975 mg Oral Q8H [address]    amLODIPine (NORVASC) tablet 5 mg  5 mg Oral Daily    docusate sodium (COLACE) capsule 100 mg  100 mg Oral BID    heparin (porcine) subcutaneous injection 5,000 Units  5,000 Units Subcutaneous Q8H [address]    HYDROmorphone (DILAUDID) injection 0 5 mg  0 5 mg Intravenous Q1H PRN    hydrOXYzine HCL (ATARAX) tablet 25 mg  25 mg Oral Q6H PRN    Labetalol HCl (NORMODYNE) injection 10 mg  10 mg Intravenous Q6H PRN    oxyCODONE (ROXICODONE) immediate release tablet 10 mg  10 mg Oral Q4H PRN    oxyCODONE (ROXICODONE) IR tablet 5 mg  5 mg Oral Q4H PRN    sodium chloride 0 9 % infusion  125 mL/hr Intravenous Continuous     Medications Prior to Admission   Medication    hydrochlorothiazide (HYDRODIURIL) 25 mg tablet       Vitals  Temp:  [98 °F (36 7 °C)-99 4 °F (37 4 °C)] 98 °F (36 7 °C)  HR:  [60-94] 68  Resp:  [18-20] 20  BP: (163-251)/(100-141) 178/108  There is no height or weight on file to calculate BMI  I/O last 24 hours: In: 100 [I V :100]  Out: -     Physical Exam  General:  Alert & oriented x3, no distress, appears stated age  HEENT:  EOMI, eyes clear, hearing intact, mucous membranes moist  Neck:  Supple, non-tender, trachea midline, no lymphadenopathy  Cardiovascular:  Regular rate, no discernable arrhythmia  Pulmonary:  Regular rate, respirations non-labored   Abdominal:  Soft, non-tender    Right Foot & Ankle Exam  Alignment:  Normal ankle alignment    Inspection:  The patient is in a posterior short-leg splint at this time  No notable swelling or discoloration of his toes noted  Palpation:  Mild tenderness at Lateral aspect of his proximal lower leg and mild tenderness with palpation around his ankle and foot generally     ROM:  Not tested  Strength:  Not tested  Stability:  Not tested  Tests:  No pertinent positive or negative tests  Neurovascular:  Sensation intact in DP/SP/Yip/Sa/T nerve distributions  Sensation intact in all digital nerve distributions  Toes warm and perfused  Gait:  Not tested  Imaging  I have personally reviewed pertinent films in PACS  XR of right tibia - From 02/07/2021 shows mildly displaced proximal right fibular shaft fracture  XR of right ankle - From 02/07/2021 shows mild widening of the medial malleolus per radiology concern for Masonneuve fracture  Lab Results  (I have personally reviewed pertinent lab results )  Results from last 7 days   Lab Units 02/08/21  0548 02/07/21 1954   WBC Thousand/uL 5 83 6 15   HEMOGLOBIN g/dL 10 5* 11 5*   HEMATOCRIT % 33 0* 36 9   PLATELETS Thousands/uL 223 262   RBC Million/uL 3 78* 4 28   MCV fL 87 86   MCH pg 27 8 26 9   MCHC g/dL 31 8 31 2*   RDW % 13 3 13 3   MPV fL 11 0 10 8   NRBC AUTO /100 WBCs  --  0     Results from last 7 days   Lab Units 02/07/21 1954   PTT seconds 28   INR  1 09     Results from last 7 days   Lab Units 02/08/21  0548 02/07/21 1954   POTASSIUM mmol/L 3 9 3 8   CHLORIDE mmol/L 105 104   CO2 mmol/L 24 28   BUN mg/dL 15 17   CREATININE mg/dL 1 38* 1 72*   EGFR ml/min/1 73sq m 69 53   CALCIUM mg/dL 8 4 9 0   ALT U/L  --  24   AST U/L  --  22                 Code Status  Level 1 - Full Code    Counseling / Coordination of Care  Total floor / unit time spent today 20 minutes  Greater than 50% of total time was spent with the patient and / or family counseling and / or coordination of care      Brittanie Fox PA-C

## 2021-02-08 NOTE — NURSING NOTE
At 1024, TAWNYA Manzo, made aware this shift of elevated BP  Aware that patient doesn't meet parameters for PRN labetalol at this time  Patient was given norvasc at [phone], and per pharmacist this nurse should not give another dose as pt has had this medication today  Instructed to recheck BP at 1200, BP [id number]=371/98, Anais updated  Will continue to monitor

## 2021-02-08 NOTE — PLAN OF CARE
Problem: Potential for Falls  Goal: Patient will remain free of falls  Description: INTERVENTIONS:  - Assess patient frequently for physical needs  -  Identify cognitive and physical deficits and behaviors that affect risk of falls    -  [place] fall precautions as indicated by assessment   - Educate patient/family on patient safety including physical limitations  - Instruct patient to call for assistance with activity based on assessment  - Modify environment to reduce risk of injury  - Consider OT/PT consult to assist with strengthening/mobility  Outcome: Progressing     Problem: Prexisting or High Potential for Compromised Skin Integrity  Goal: Skin integrity is maintained or improved  Description: INTERVENTIONS:  - Identify patients at risk for skin breakdown  - Assess and monitor skin integrity  - Assess and monitor nutrition and hydration status  - Monitor labs   - Assess for incontinence   - Turn and reposition patient  - Assist with mobility/ambulation  - Relieve pressure over bony prominences  - Avoid friction and shearing  - Provide appropriate hygiene as needed including keeping skin clean and dry  - Evaluate need for skin moisturizer/barrier cream  - Collaborate with interdisciplinary team   - Patient/family teaching  - Consider wound care consult   Outcome: Progressing     Problem: PAIN - ADULT  Goal: Verbalizes/displays adequate comfort level or baseline comfort level  Description: Interventions:  - Encourage patient to monitor pain and request assistance  - Assess pain using appropriate pain scale  - Administer analgesics based on type and severity of pain and evaluate response  - Implement non-pharmacological measures as appropriate and evaluate response  - Consider cultural and social influences on pain and pain management  - Notify physician/advanced practitioner if interventions unsuccessful or patient reports new pain  Outcome: Progressing     Problem: INFECTION - ADULT  Goal: Absence or prevention of progression during hospitalization  Description: INTERVENTIONS:  - Assess and monitor for signs and symptoms of infection  - Monitor lab/diagnostic results  - Monitor all insertion sites, i e  indwelling lines, tubes, and drains  - Monitor endotracheal if appropriate and nasal secretions for changes in amount and color  - [place] appropriate cooling/warming therapies per order  - Administer medications as ordered  - Instruct and encourage patient and family to use good hand hygiene technique  - Identify and instruct in appropriate isolation precautions for identified infection/condition  Outcome: Progressing  Goal: Absence of fever/infection during neutropenic period  Description: INTERVENTIONS:  - Monitor WBC    Outcome: Progressing     Problem: SAFETY ADULT  Goal: Patient will remain free of falls  Description: INTERVENTIONS:  - Assess patient frequently for physical needs  -  Identify cognitive and physical deficits and behaviors that affect risk of falls    -  [place] fall precautions as indicated by assessment   - Educate patient/family on patient safety including physical limitations  - Instruct patient to call for assistance with activity based on assessment  - Modify environment to reduce risk of injury  - Consider OT/PT consult to assist with strengthening/mobility  Outcome: Progressing  Goal: Maintain or return to baseline ADL function  Description: INTERVENTIONS:  -  Assess patient's ability to carry out ADLs; assess patient's baseline for ADL function and identify physical deficits which impact ability to perform ADLs (bathing, care of mouth/teeth, toileting, grooming, dressing, etc )  - Assess/evaluate cause of self-care deficits   - Assess range of motion  - Assess patient's mobility; develop plan if impaired  - Assess patient's need for assistive devices and provide as appropriate  - Encourage maximum independence but intervene and supervise when necessary  - Involve family in performance of ADLs  - Assess for home care needs following discharge   - Consider OT consult to assist with ADL evaluation and planning for discharge  - Provide patient education as appropriate  Outcome: Progressing  Goal: Maintain or return mobility status to optimal level  Description: INTERVENTIONS:  - Assess patient's baseline mobility status (ambulation, transfers, stairs, etc )    - Identify cognitive and physical deficits and behaviors that affect mobility  - Identify mobility aids required to assist with transfers and/or ambulation (gait belt, sit-to-stand, lift, walker, cane, etc )  - [place] fall precautions as indicated by assessment  - Record patient progress and toleration of activity level on Mobility SBAR; progress patient to next Phase/Stage  - Instruct patient to call for assistance with activity based on assessment  - Consider rehabilitation consult to assist with strengthening/weightbearing, etc   Outcome: Progressing     Problem: DISCHARGE PLANNING  Goal: Discharge to home or other facility with appropriate resources  Description: INTERVENTIONS:  - Identify barriers to discharge w/patient and caregiver  - Arrange for needed discharge resources and transportation as appropriate  - Identify discharge learning needs (meds, wound care, etc )  - Arrange for interpretive services to assist at discharge as needed  - Refer to Case Management Department for coordinating discharge planning if the patient needs post-hospital services based on physician/advanced practitioner order or complex needs related to functional status, cognitive ability, or social support system  Outcome: Progressing     Problem: Knowledge Deficit  Goal: Patient/family/caregiver demonstrates understanding of disease process, treatment plan, medications, and discharge instructions  Description: Complete learning assessment and assess knowledge base    Interventions:  - Provide teaching at level of understanding  - Provide teaching via preferred learning methods  Outcome: Progressing     Problem: NEUROSENSORY - ADULT  Goal: Achieves stable or improved neurological status  Description: INTERVENTIONS  - Monitor and report changes in neurological status  - Monitor vital signs such as temperature, blood pressure, glucose, and any other labs ordered   - Initiate measures to prevent increased intracranial pressure  - Monitor for seizure activity and implement precautions if appropriate      Outcome: Progressing  Goal: Remains free of injury related to seizures activity  Description: INTERVENTIONS  - Maintain airway, patient safety  and administer oxygen as ordered  - Monitor patient for seizure activity, document and report duration and description of seizure to physician/advanced practitioner  - If seizure occurs,  ensure patient safety during seizure  - Reorient patient post seizure  - Seizure pads on all 4 side rails  - Instruct patient/family to notify RN of any seizure activity including if an aura is experienced  - Instruct patient/family to call for assistance with activity based on nursing assessment  - Administer anti-seizure medications if ordered    Outcome: Progressing  Goal: Achieves maximal functionality and self care  Description: INTERVENTIONS  - Monitor swallowing and airway patency with patient fatigue and changes in neurological status  - Encourage and assist patient to increase activity and self care     - Encourage visually impaired, hearing impaired and aphasic patients to use assistive/communication devices  Outcome: Progressing     Problem: CARDIOVASCULAR - ADULT  Goal: Maintains optimal cardiac output and hemodynamic stability  Description: INTERVENTIONS:  - Monitor I/O, vital signs and rhythm  - Monitor for S/S and trends of decreased cardiac output  - Administer and titrate ordered vasoactive medications to optimize hemodynamic stability  - Assess quality of pulses, skin color and temperature  - Assess for signs of decreased coronary artery perfusion  - Instruct patient to report change in severity of symptoms  Outcome: Progressing  Goal: Absence of cardiac dysrhythmias or at baseline rhythm  Description: INTERVENTIONS:  - Continuous cardiac monitoring, vital signs, obtain 12 lead EKG if ordered  - Administer antiarrhythmic and heart rate control medications as ordered  - Monitor electrolytes and administer replacement therapy as ordered  Outcome: Progressing     Problem: RESPIRATORY - ADULT  Goal: Achieves optimal ventilation and oxygenation  Description: INTERVENTIONS:  - Assess for changes in respiratory status  - Assess for changes in mentation and behavior  - Position to facilitate oxygenation and minimize respiratory effort  - Oxygen administered by appropriate delivery if ordered  - Initiate smoking cessation education as indicated  - Encourage broncho-pulmonary hygiene including cough, deep breathe, Incentive Spirometry  - Assess the need for suctioning and aspirate as needed  - Assess and instruct to report SOB or any respiratory difficulty  - Respiratory Therapy support as indicated  Outcome: Progressing     Problem: GASTROINTESTINAL - ADULT  Goal: Minimal or absence of nausea and/or vomiting  Description: INTERVENTIONS:  - Administer IV fluids if ordered to ensure adequate hydration  - Maintain NPO status until nausea and vomiting are resolved  - Nasogastric tube if ordered  - Administer ordered antiemetic medications as needed  - Provide nonpharmacologic comfort measures as appropriate  - Advance diet as tolerated, if ordered  - Consider nutrition services referral to assist patient with adequate nutrition and appropriate food choices  Outcome: Progressing  Goal: Maintains or returns to baseline bowel function  Description: INTERVENTIONS:  - Assess bowel function  - Encourage oral fluids to ensure adequate hydration  - Administer IV fluids if ordered to ensure adequate hydration  - Administer ordered medications as needed  - Encourage mobilization and activity  - Consider nutritional services referral to assist patient with adequate nutrition and appropriate food choices  Outcome: Progressing  Goal: Maintains adequate nutritional intake  Description: INTERVENTIONS:  - Monitor percentage of each meal consumed  - Identify factors contributing to decreased intake, treat as appropriate  - Assist with meals as needed  - Monitor I&O, weight, and lab values if indicated  - Obtain nutrition services referral as needed  Outcome: Progressing     Problem: GENITOURINARY - ADULT  Goal: Maintains or returns to baseline urinary function  Description: INTERVENTIONS:  - Assess urinary function  - Encourage oral fluids to ensure adequate hydration if ordered  - Administer IV fluids as ordered to ensure adequate hydration  - Administer ordered medications as needed  - Offer frequent toileting  - Follow urinary retention protocol if ordered  Outcome: Progressing  Goal: Absence of urinary retention  Description: INTERVENTIONS:  - Assess patients ability to void and empty bladder  - Monitor I/O  - Bladder scan as needed  - Discuss with physician/AP medications to alleviate retention as needed  - Discuss catheterization for long term situations as appropriate  Outcome: Progressing     Problem: METABOLIC, FLUID AND ELECTROLYTES - ADULT  Goal: Electrolytes maintained within normal limits  Description: INTERVENTIONS:  - Monitor labs and assess patient for signs and symptoms of electrolyte imbalances  - Administer electrolyte replacement as ordered  - Monitor response to electrolyte replacements, including repeat lab results as appropriate  - Instruct patient on fluid and nutrition as appropriate  Outcome: Progressing  Goal: Fluid balance maintained  Description: INTERVENTIONS:  - Monitor labs   - Monitor I/O and WT  - Instruct patient on fluid and nutrition as appropriate  - Assess for signs & symptoms of volume excess or deficit  Outcome: Progressing  Goal: Glucose maintained within target range  Description: INTERVENTIONS:  - Monitor Blood Glucose as ordered  - Assess for signs and symptoms of hyperglycemia and hypoglycemia  - Administer ordered medications to maintain glucose within target range  - Assess nutritional intake and initiate nutrition service referral as needed  Outcome: Progressing     Problem: SKIN/TISSUE INTEGRITY - ADULT  Goal: Skin integrity remains intact  Description: INTERVENTIONS  - Identify patients at risk for skin breakdown  - Assess and monitor skin integrity  - Assess and monitor nutrition and hydration status  - Monitor labs (i e  albumin)  - Assess for incontinence   - Turn and reposition patient  - Assist with mobility/ambulation  - Relieve pressure over bony prominences  - Avoid friction and shearing  - Provide appropriate hygiene as needed including keeping skin clean and dry  - Evaluate need for skin moisturizer/barrier cream  - Collaborate with interdisciplinary team (i e  Nutrition, Rehabilitation, etc )   - Patient/family teaching  Outcome: Progressing  Goal: Incision(s), wounds(s) or drain site(s) healing without S/S of infection  Description: INTERVENTIONS  - Assess and document risk factors for skin impairment   - Assess and document dressing, incision, wound bed, drain sites and surrounding tissue  - Consider nutrition services referral as needed  - Oral mucous membranes remain intact  - Provide patient/ family education  Outcome: Progressing  Goal: Oral mucous membranes remain intact  Description: INTERVENTIONS  - Assess oral mucosa and hygiene practices  - Implement preventative oral hygiene regimen  - Implement oral medicated treatments as ordered  - Initiate Nutrition services referral as needed  Outcome: Progressing     Problem: HEMATOLOGIC - ADULT  Goal: Maintains hematologic stability  Description: INTERVENTIONS  - Assess for signs and symptoms of bleeding or hemorrhage  - Monitor labs  - Administer supportive blood products/factors as ordered and appropriate  Outcome: Progressing     Problem: MUSCULOSKELETAL - ADULT  Goal: Maintain or return mobility to safest level of function  Description: INTERVENTIONS:  - Assess patient's ability to carry out ADLs; assess patient's baseline for ADL function and identify physical deficits which impact ability to perform ADLs (bathing, care of mouth/teeth, toileting, grooming, dressing, etc )  - Assess/evaluate cause of self-care deficits   - Assess range of motion  - Assess patient's mobility  - Assess patient's need for assistive devices and provide as appropriate  - Encourage maximum independence but intervene and supervise when necessary  - Involve family in performance of ADLs  - Assess for home care needs following discharge   - Consider OT consult to assist with ADL evaluation and planning for discharge  - Provide patient education as appropriate  Outcome: Progressing  Goal: Maintain proper alignment of affected body part  Description: INTERVENTIONS:  - Support, maintain and protect limb and body alignment  - Provide patient/ family with appropriate education  Outcome: Progressing

## 2021-02-09 LAB
ANION GAP SERPL CALCULATED.3IONS-SCNC: 4 MMOL/L (ref 4–13)
BUN SERPL-MCNC: 12 MG/DL (ref 5–25)
CALCIUM SERPL-MCNC: 8.6 MG/DL (ref 8.3–10.1)
CHLORIDE SERPL-SCNC: 103 MMOL/L (ref 100–108)
CO2 SERPL-SCNC: 29 MMOL/L (ref 21–32)
CREAT SERPL-MCNC: 1.43 MG/DL (ref 0.6–1.3)
GFR SERPL CREATININE-BSD FRML MDRD: 66 ML/MIN/1.73SQ M
GLUCOSE SERPL-MCNC: 92 MG/DL (ref 65–140)
POTASSIUM SERPL-SCNC: 4.3 MMOL/L (ref 3.5–5.3)
SODIUM SERPL-SCNC: 136 MMOL/L (ref 136–145)

## 2021-02-09 PROCEDURE — 99232 SBSQ HOSP IP/OBS MODERATE 35: CPT | Performed by: PHYSICIAN ASSISTANT

## 2021-02-09 PROCEDURE — 97110 THERAPEUTIC EXERCISES: CPT

## 2021-02-09 PROCEDURE — 97116 GAIT TRAINING THERAPY: CPT

## 2021-02-09 PROCEDURE — 80048 BASIC METABOLIC PNL TOTAL CA: CPT | Performed by: PHYSICIAN ASSISTANT

## 2021-02-09 PROCEDURE — 97763 ORTHC/PROSTC MGMT SBSQ ENC: CPT

## 2021-02-09 RX ORDER — AMLODIPINE BESYLATE 5 MG/1
5 TABLET ORAL ONCE
Status: COMPLETED | OUTPATIENT
Start: 2021-02-09 | End: 2021-02-09

## 2021-02-09 RX ORDER — LABETALOL 20 MG/4 ML (5 MG/ML) INTRAVENOUS SYRINGE
10 ONCE
Status: COMPLETED | OUTPATIENT
Start: 2021-02-09 | End: 2021-02-09

## 2021-02-09 RX ORDER — AMLODIPINE BESYLATE 10 MG/1
10 TABLET ORAL DAILY
Status: DISCONTINUED | OUTPATIENT
Start: 2021-02-10 | End: 2021-02-10

## 2021-02-09 RX ORDER — LISINOPRIL 5 MG/1
5 TABLET ORAL DAILY
Status: DISCONTINUED | OUTPATIENT
Start: 2021-02-09 | End: 2021-02-10

## 2021-02-09 RX ORDER — HYDRALAZINE HYDROCHLORIDE 20 MG/ML
10 INJECTION INTRAMUSCULAR; INTRAVENOUS EVERY 6 HOURS PRN
Status: DISCONTINUED | OUTPATIENT
Start: 2021-02-09 | End: 2021-02-10

## 2021-02-09 RX ADMIN — AMLODIPINE BESYLATE 5 MG: 5 TABLET ORAL at 08:02

## 2021-02-09 RX ADMIN — DOCUSATE SODIUM 100 MG: 100 CAPSULE, LIQUID FILLED ORAL at 17:03

## 2021-02-09 RX ADMIN — ACETAMINOPHEN 975 MG: 325 TABLET ORAL at 05:06

## 2021-02-09 RX ADMIN — OXYCODONE HYDROCHLORIDE 10 MG: 10 TABLET ORAL at 08:02

## 2021-02-09 RX ADMIN — HYDROMORPHONE HYDROCHLORIDE 0.5 MG: 1 INJECTION, SOLUTION INTRAMUSCULAR; INTRAVENOUS; SUBCUTANEOUS at 04:58

## 2021-02-09 RX ADMIN — AMLODIPINE BESYLATE 5 MG: 5 TABLET ORAL at 08:44

## 2021-02-09 RX ADMIN — HEPARIN SODIUM 5000 UNITS: 5000 INJECTION INTRAVENOUS; SUBCUTANEOUS at 21:58

## 2021-02-09 RX ADMIN — DOCUSATE SODIUM 100 MG: 100 CAPSULE, LIQUID FILLED ORAL at 08:12

## 2021-02-09 RX ADMIN — LISINOPRIL 5 MG: 5 TABLET ORAL at 14:37

## 2021-02-09 RX ADMIN — ACETAMINOPHEN 975 MG: 325 TABLET ORAL at 21:58

## 2021-02-09 RX ADMIN — HYDRALAZINE HYDROCHLORIDE 10 MG: 20 INJECTION INTRAMUSCULAR; INTRAVENOUS at 16:56

## 2021-02-09 RX ADMIN — HYDROCHLOROTHIAZIDE 12.5 MG: 12.5 TABLET ORAL at 08:02

## 2021-02-09 RX ADMIN — OXYCODONE HYDROCHLORIDE 10 MG: 10 TABLET ORAL at 18:34

## 2021-02-09 RX ADMIN — HYDROMORPHONE HYDROCHLORIDE 0.5 MG: 1 INJECTION, SOLUTION INTRAMUSCULAR; INTRAVENOUS; SUBCUTANEOUS at 20:18

## 2021-02-09 RX ADMIN — LABETALOL 20 MG/4 ML (5 MG/ML) INTRAVENOUS SYRINGE 10 MG: at 18:40

## 2021-02-09 RX ADMIN — HEPARIN SODIUM 5000 UNITS: 5000 INJECTION INTRAVENOUS; SUBCUTANEOUS at 13:29

## 2021-02-09 RX ADMIN — OXYCODONE HYDROCHLORIDE 10 MG: 10 TABLET ORAL at 13:33

## 2021-02-09 RX ADMIN — OXYCODONE HYDROCHLORIDE 10 MG: 10 TABLET ORAL at 02:29

## 2021-02-09 RX ADMIN — HEPARIN SODIUM 5000 UNITS: 5000 INJECTION INTRAVENOUS; SUBCUTANEOUS at 05:06

## 2021-02-09 RX ADMIN — HYDRALAZINE HYDROCHLORIDE 10 MG: 20 INJECTION INTRAMUSCULAR; INTRAVENOUS at 10:56

## 2021-02-09 RX ADMIN — LABETALOL 20 MG/4 ML (5 MG/ML) INTRAVENOUS SYRINGE 15 MG: at 04:59

## 2021-02-09 RX ADMIN — ACETAMINOPHEN 975 MG: 325 TABLET ORAL at 13:30

## 2021-02-09 NOTE — ASSESSMENT & PLAN NOTE
Mechanical fall on black ice with Right fibula fracture  Orthopedics consultation appreciated   Conservative treatment   Cam walker boot   PT consulted - return to home with social support   Non-weightbearing RLE with crutches or walker   Pain control   Follow up with Dr Isabela Morse in 1 week for repeat xrays

## 2021-02-09 NOTE — PLAN OF CARE
Problem: Potential for Falls  Goal: Patient will remain free of falls  Description: INTERVENTIONS:  - Assess patient frequently for physical needs  -  Identify cognitive and physical deficits and behaviors that affect risk of falls    -  [place] fall precautions as indicated by assessment   - Educate patient/family on patient safety including physical limitations  - Instruct patient to call for assistance with activity based on assessment  - Modify environment to reduce risk of injury  - Consider OT/PT consult to assist with strengthening/mobility  Outcome: Progressing     Problem: Prexisting or High Potential for Compromised Skin Integrity  Goal: Skin integrity is maintained or improved  Description: INTERVENTIONS:  - Identify patients at risk for skin breakdown  - Assess and monitor skin integrity  - Assess and monitor nutrition and hydration status  - Monitor labs   - Assess for incontinence   - Turn and reposition patient  - Assist with mobility/ambulation  - Relieve pressure over bony prominences  - Avoid friction and shearing  - Provide appropriate hygiene as needed including keeping skin clean and dry  - Evaluate need for skin moisturizer/barrier cream  - Collaborate with interdisciplinary team   - Patient/family teaching  - Consider wound care consult   Outcome: Progressing     Problem: PAIN - ADULT  Goal: Verbalizes/displays adequate comfort level or baseline comfort level  Description: Interventions:  - Encourage patient to monitor pain and request assistance  - Assess pain using appropriate pain scale  - Administer analgesics based on type and severity of pain and evaluate response  - Implement non-pharmacological measures as appropriate and evaluate response  - Consider cultural and social influences on pain and pain management  - Notify physician/advanced practitioner if interventions unsuccessful or patient reports new pain  Outcome: Progressing     Problem: INFECTION - ADULT  Goal: Absence or prevention of progression during hospitalization  Description: INTERVENTIONS:  - Assess and monitor for signs and symptoms of infection  - Monitor lab/diagnostic results  - Monitor all insertion sites, i e  indwelling lines, tubes, and drains  - Monitor endotracheal if appropriate and nasal secretions for changes in amount and color  - [place] appropriate cooling/warming therapies per order  - Administer medications as ordered  - Instruct and encourage patient and family to use good hand hygiene technique  - Identify and instruct in appropriate isolation precautions for identified infection/condition  Outcome: Progressing  Goal: Absence of fever/infection during neutropenic period  Description: INTERVENTIONS:  - Monitor WBC    Outcome: Progressing     Problem: SAFETY ADULT  Goal: Patient will remain free of falls  Description: INTERVENTIONS:  - Assess patient frequently for physical needs  -  Identify cognitive and physical deficits and behaviors that affect risk of falls    -  [place] fall precautions as indicated by assessment   - Educate patient/family on patient safety including physical limitations  - Instruct patient to call for assistance with activity based on assessment  - Modify environment to reduce risk of injury  - Consider OT/PT consult to assist with strengthening/mobility  Outcome: Progressing  Goal: Maintain or return to baseline ADL function  Description: INTERVENTIONS:  -  Assess patient's ability to carry out ADLs; assess patient's baseline for ADL function and identify physical deficits which impact ability to perform ADLs (bathing, care of mouth/teeth, toileting, grooming, dressing, etc )  - Assess/evaluate cause of self-care deficits   - Assess range of motion  - Assess patient's mobility; develop plan if impaired  - Assess patient's need for assistive devices and provide as appropriate  - Encourage maximum independence but intervene and supervise when necessary  - Involve family in performance of ADLs  - Assess for home care needs following discharge   - Consider OT consult to assist with ADL evaluation and planning for discharge  - Provide patient education as appropriate  Outcome: Progressing  Goal: Maintain or return mobility status to optimal level  Description: INTERVENTIONS:  - Assess patient's baseline mobility status (ambulation, transfers, stairs, etc )    - Identify cognitive and physical deficits and behaviors that affect mobility  - Identify mobility aids required to assist with transfers and/or ambulation (gait belt, sit-to-stand, lift, walker, cane, etc )  - [place] fall precautions as indicated by assessment  - Record patient progress and toleration of activity level on Mobility SBAR; progress patient to next Phase/Stage  - Instruct patient to call for assistance with activity based on assessment  - Consider rehabilitation consult to assist with strengthening/weightbearing, etc   Outcome: Progressing     Problem: DISCHARGE PLANNING  Goal: Discharge to home or other facility with appropriate resources  Description: INTERVENTIONS:  - Identify barriers to discharge w/patient and caregiver  - Arrange for needed discharge resources and transportation as appropriate  - Identify discharge learning needs (meds, wound care, etc )  - Arrange for interpretive services to assist at discharge as needed  - Refer to Case Management Department for coordinating discharge planning if the patient needs post-hospital services based on physician/advanced practitioner order or complex needs related to functional status, cognitive ability, or social support system  Outcome: Progressing     Problem: Knowledge Deficit  Goal: Patient/family/caregiver demonstrates understanding of disease process, treatment plan, medications, and discharge instructions  Description: Complete learning assessment and assess knowledge base    Interventions:  - Provide teaching at level of understanding  - Provide teaching via preferred learning methods  Outcome: Progressing     Problem: NEUROSENSORY - ADULT  Goal: Achieves stable or improved neurological status  Description: INTERVENTIONS  - Monitor and report changes in neurological status  - Monitor vital signs such as temperature, blood pressure, glucose, and any other labs ordered   - Initiate measures to prevent increased intracranial pressure  - Monitor for seizure activity and implement precautions if appropriate      Outcome: Progressing  Goal: Remains free of injury related to seizures activity  Description: INTERVENTIONS  - Maintain airway, patient safety  and administer oxygen as ordered  - Monitor patient for seizure activity, document and report duration and description of seizure to physician/advanced practitioner  - If seizure occurs,  ensure patient safety during seizure  - Reorient patient post seizure  - Seizure pads on all 4 side rails  - Instruct patient/family to notify RN of any seizure activity including if an aura is experienced  - Instruct patient/family to call for assistance with activity based on nursing assessment  - Administer anti-seizure medications if ordered    Outcome: Progressing  Goal: Achieves maximal functionality and self care  Description: INTERVENTIONS  - Monitor swallowing and airway patency with patient fatigue and changes in neurological status  - Encourage and assist patient to increase activity and self care     - Encourage visually impaired, hearing impaired and aphasic patients to use assistive/communication devices  Outcome: Progressing     Problem: CARDIOVASCULAR - ADULT  Goal: Maintains optimal cardiac output and hemodynamic stability  Description: INTERVENTIONS:  - Monitor I/O, vital signs and rhythm  - Monitor for S/S and trends of decreased cardiac output  - Administer and titrate ordered vasoactive medications to optimize hemodynamic stability  - Assess quality of pulses, skin color and temperature  - Assess for signs of decreased coronary artery perfusion  - Instruct patient to report change in severity of symptoms  Outcome: Progressing  Goal: Absence of cardiac dysrhythmias or at baseline rhythm  Description: INTERVENTIONS:  - Continuous cardiac monitoring, vital signs, obtain 12 lead EKG if ordered  - Administer antiarrhythmic and heart rate control medications as ordered  - Monitor electrolytes and administer replacement therapy as ordered  Outcome: Progressing     Problem: RESPIRATORY - ADULT  Goal: Achieves optimal ventilation and oxygenation  Description: INTERVENTIONS:  - Assess for changes in respiratory status  - Assess for changes in mentation and behavior  - Position to facilitate oxygenation and minimize respiratory effort  - Oxygen administered by appropriate delivery if ordered  - Initiate smoking cessation education as indicated  - Encourage broncho-pulmonary hygiene including cough, deep breathe, Incentive Spirometry  - Assess the need for suctioning and aspirate as needed  - Assess and instruct to report SOB or any respiratory difficulty  - Respiratory Therapy support as indicated  Outcome: Progressing     Problem: GASTROINTESTINAL - ADULT  Goal: Minimal or absence of nausea and/or vomiting  Description: INTERVENTIONS:  - Administer IV fluids if ordered to ensure adequate hydration  - Maintain NPO status until nausea and vomiting are resolved  - Nasogastric tube if ordered  - Administer ordered antiemetic medications as needed  - Provide nonpharmacologic comfort measures as appropriate  - Advance diet as tolerated, if ordered  - Consider nutrition services referral to assist patient with adequate nutrition and appropriate food choices  Outcome: Progressing  Goal: Maintains or returns to baseline bowel function  Description: INTERVENTIONS:  - Assess bowel function  - Encourage oral fluids to ensure adequate hydration  - Administer IV fluids if ordered to ensure adequate hydration  - Administer ordered medications as needed  - Encourage mobilization and activity  - Consider nutritional services referral to assist patient with adequate nutrition and appropriate food choices  Outcome: Progressing  Goal: Maintains adequate nutritional intake  Description: INTERVENTIONS:  - Monitor percentage of each meal consumed  - Identify factors contributing to decreased intake, treat as appropriate  - Assist with meals as needed  - Monitor I&O, weight, and lab values if indicated  - Obtain nutrition services referral as needed  Outcome: Progressing     Problem: GENITOURINARY - ADULT  Goal: Maintains or returns to baseline urinary function  Description: INTERVENTIONS:  - Assess urinary function  - Encourage oral fluids to ensure adequate hydration if ordered  - Administer IV fluids as ordered to ensure adequate hydration  - Administer ordered medications as needed  - Offer frequent toileting  - Follow urinary retention protocol if ordered  Outcome: Progressing  Goal: Absence of urinary retention  Description: INTERVENTIONS:  - Assess patients ability to void and empty bladder  - Monitor I/O  - Bladder scan as needed  - Discuss with physician/AP medications to alleviate retention as needed  - Discuss catheterization for long term situations as appropriate  Outcome: Progressing     Problem: METABOLIC, FLUID AND ELECTROLYTES - ADULT  Goal: Electrolytes maintained within normal limits  Description: INTERVENTIONS:  - Monitor labs and assess patient for signs and symptoms of electrolyte imbalances  - Administer electrolyte replacement as ordered  - Monitor response to electrolyte replacements, including repeat lab results as appropriate  - Instruct patient on fluid and nutrition as appropriate  Outcome: Progressing  Goal: Fluid balance maintained  Description: INTERVENTIONS:  - Monitor labs   - Monitor I/O and WT  - Instruct patient on fluid and nutrition as appropriate  - Assess for signs & symptoms of volume excess or deficit  Outcome: Progressing  Goal: Glucose maintained within target range  Description: INTERVENTIONS:  - Monitor Blood Glucose as ordered  - Assess for signs and symptoms of hyperglycemia and hypoglycemia  - Administer ordered medications to maintain glucose within target range  - Assess nutritional intake and initiate nutrition service referral as needed  Outcome: Progressing     Problem: SKIN/TISSUE INTEGRITY - ADULT  Goal: Skin integrity remains intact  Description: INTERVENTIONS  - Identify patients at risk for skin breakdown  - Assess and monitor skin integrity  - Assess and monitor nutrition and hydration status  - Monitor labs (i e  albumin)  - Assess for incontinence   - Turn and reposition patient  - Assist with mobility/ambulation  - Relieve pressure over bony prominences  - Avoid friction and shearing  - Provide appropriate hygiene as needed including keeping skin clean and dry  - Evaluate need for skin moisturizer/barrier cream  - Collaborate with interdisciplinary team (i e  Nutrition, Rehabilitation, etc )   - Patient/family teaching  Outcome: Progressing  Goal: Incision(s), wounds(s) or drain site(s) healing without S/S of infection  Description: INTERVENTIONS  - Assess and document risk factors for skin impairment   - Assess and document dressing, incision, wound bed, drain sites and surrounding tissue  - Consider nutrition services referral as needed  - Oral mucous membranes remain intact  - Provide patient/ family education  Outcome: Progressing  Goal: Oral mucous membranes remain intact  Description: INTERVENTIONS  - Assess oral mucosa and hygiene practices  - Implement preventative oral hygiene regimen  - Implement oral medicated treatments as ordered  - Initiate Nutrition services referral as needed  Outcome: Progressing     Problem: HEMATOLOGIC - ADULT  Goal: Maintains hematologic stability  Description: INTERVENTIONS  - Assess for signs and symptoms of bleeding or hemorrhage  - Monitor labs  - Administer supportive blood products/factors as ordered and appropriate  Outcome: Progressing     Problem: MUSCULOSKELETAL - ADULT  Goal: Maintain or return mobility to safest level of function  Description: INTERVENTIONS:  - Assess patient's ability to carry out ADLs; assess patient's baseline for ADL function and identify physical deficits which impact ability to perform ADLs (bathing, care of mouth/teeth, toileting, grooming, dressing, etc )  - Assess/evaluate cause of self-care deficits   - Assess range of motion  - Assess patient's mobility  - Assess patient's need for assistive devices and provide as appropriate  - Encourage maximum independence but intervene and supervise when necessary  - Involve family in performance of ADLs  - Assess for home care needs following discharge   - Consider OT consult to assist with ADL evaluation and planning for discharge  - Provide patient education as appropriate  Outcome: Progressing  Goal: Maintain proper alignment of affected body part  Description: INTERVENTIONS:  - Support, maintain and protect limb and body alignment  - Provide patient/ family with appropriate education  Outcome: Progressing

## 2021-02-09 NOTE — ASSESSMENT & PLAN NOTE
Unknown baseline creatinine  Likely pre renal as patient did report alcohol use over the weekend and little oral hydration  I suspect underlying CKD secondary to chronic hypertensive nephrosclerosis  POA Cr 1 7 --> 1 38 --> 1 43   BMP in AM   Should establish care with Nephrology outpatient

## 2021-02-09 NOTE — PHYSICIAN ADVISOR
Current patient class: Observation  The patient is currently on Hospital Day: 3 at [address] [place] [address]      This patient was originally admitted to the hospital under observation class  After admission the patient was reevaluated and determined to require further hospitalization  After review of the relevant documentation, labs, vital signs and test results, the patient is appropriate for INPATIENT ADMISSION  Admission to the hospital as an inpatient is a complex decision making process which requires the practitioner to consider the patients presenting complaint, history and physical examination and all relevant testing  With this in mind, in this case, the patient was deemed appropriate for INPATIENT ADMISSION  After review of the documentation and testing available at the time of the admission I concur with this clinical determination of medical necessity  Rationale is as follows: The patient is expected to require hospitalization beyond 48 hours  He had mechanical fall with right fibula fracture  He is being treated for hypertensive urgency in the setting of uncontrolled hypertension  The patient had acute kidney injury  Blood pressure was initially as high as 251/134  Yesterday there were several readings as high as 205/120 and 202/129  Today it is gradually trending down but was as high as 190/112  Medication adjustments continued to be made  He continues to receive intravenous medication for blood pressure management as well as pain medication  Change to inpatient class is appropriate  This was discussed with UR      The patients vitals on arrival were   ED Triage Vitals [02/07/21 1859]   Temperature Pulse Respirations Blood Pressure SpO2   99 4 °F (37 4 °C) 94 20 (!) 220/141 96 %      Temp Source Heart Rate Source Patient Position - Orthostatic VS BP Location FiO2 (%)   Oral Monitor Sitting Right arm --      Pain Score       Worst Possible Pain           Past Medical History:   Diagnosis Date    Hypertension      Past Surgical History:   Procedure Laterality Date    MANDIBLE FRACTURE SURGERY         The patient was admitted to the hospital at N/A on N/A for the following diagnosis:  Hypertensive crisis [I16 9]  Ankle injury [S99 919A]  Fibula fracture [S82 409A]  MITCHELL (acute kidney injury) ([facility] [address] ) [N17 9]  Anemia, unspecified type [D64 9]    Consults have been placed to:   IP CONSULT TO ORTHOPEDIC SURGERY  IP CONSULT TO CASE MANAGEMENT    Vitals:    02/09/21 0748 02/09/21 0849 02/09/21 1056 02/09/21 1404   BP: (!) 190/112 (!) 189/120 (!) 168/122 (!) 162/102   BP Location:   Left arm Left arm   Pulse: 76      Resp: 17      Temp: 97 5 °F (36 4 °C)      TempSrc:       SpO2: 98%          Most recent labs:    Recent Labs     02/07/21 1954 02/07/21 2016 02/08/21  0548 02/09/21  0506   WBC 6 15  --  5 83  --    HGB 11 5*  --  10 5*  --    HCT 36 9  --  33 0*  --      --  223  --    K 3 8  --  3 9 4 3   CALCIUM 9 0  --  8 4 8 6   BUN 17  --  15 12   CREATININE 1 72*  --  1 38* 1 43*   INR 1 09  --   --   --    TROPONINI  --  <0 02  --   --    CKTOTAL 467*  --   --   --    AST 22  --   --   --    ALT 24  --   --   --    ALKPHOS 71  --   --   --        Scheduled Meds:  Current Facility-Administered Medications   Medication Dose Route Frequency Provider Last Rate    acetaminophen  975 mg Oral [facility] Brittanie Clayton DO      [START ON 2/10/2021] amLODIPine  10 mg Oral Daily Anais Raphael PA-C      docusate sodium  100 mg Oral BID Brittanie Clayton DO      heparin (porcine)  5,000 Units Subcutaneous [facility] Brittanie Clayton DO      hydrALAZINE  10 mg Intravenous Q6H PRN Edwardo Mitchell PA-C      hydrochlorothiazide  12 5 mg Oral Daily Edwardo Mitchell PA-C      HYDROmorphone  0 5 mg Intravenous Q1H PRN Brittanie Clayton DO      hydrOXYzine HCL  25 mg Oral Q6H PRN Brittanie Clayton, DO      lisinopril  5 mg Oral Daily Edwardo Mitchell PA-C      oxyCODONYE 10 mg Oral Q4H PRN Harley Marlow, DO      oxyCODONE  5 mg Oral Q4H PRN Harley Marlow, DO       Continuous Infusions:   PRN Meds: hydrALAZINE    HYDROmorphone    hydrOXYzine HCL    oxyCODONE    oxyCODONE    Surgical procedures (if appropriate):

## 2021-02-09 NOTE — ASSESSMENT & PLAN NOTE
Likely in setting of underlying uncontrolled hypertension  I reviewed last few ED visits back to 2018 with blood pressures 170-190/110's   Had been on hydrochlorothiazide in the past but has not taken in over a year  Systolic Blood pressures as high as 245 in emergency department  Nephrology consulted due to resistant hypertension   Checking renal ultrasound   Checking plasma metanephrines, renin, aldosterone   Spoke with Nephrology, has an appointment outpatient on Tuesday for follow up   Patient is now agreeable to stay today to monitor BP   Antihypertensive regimen now: norvasc 10 mg daily, coreg 12 5 bid and lisinopril 20 mg   Stop HCTZ   BMP in AM

## 2021-02-09 NOTE — PLAN OF CARE
Problem: PHYSICAL THERAPY ADULT  Goal: Performs mobility at highest level of function for planned discharge setting  See evaluation for individualized goals  Description: Treatment/Interventions: LE strengthening/ROM, Functional transfer training, Elevations, Therapeutic exercise, Endurance training, Patient/family training, Equipment eval/education, Bed mobility, Gait training, Compensatory technique education, Continued evaluation, Spoke to nursing, Spoke to advanced practitioner  Equipment Recommended: Delores Joseph, Other (Comment)(RW, BSC)       See flowsheet documentation for full assessment, interventions and recommendations  Outcome: Progressing  Note: Prognosis: Good  Problem List: Decreased strength, Decreased endurance, Impaired balance, Decreased range of motion, Decreased mobility, Orthopedic restrictions, Pain  Assessment: Pt  Seen for PT interventions as per POC  Pt  Performs supine and seated le arom exercises x 10 reps  with verbal cues for correct performance  Pt tolerated well  Pt dons/ doffs cam boot with supervision with moderate verbal cues for correct donning and doffing techniques  Pt  Progressed with mobility training, less assistance for all mobility  Pt  Performs bed mobility supine to sit with mod I and sit to supine I, transfers with supervision with verbal cues for proper hand placement and safe approach to bed  Pt  Progressed with ambulation distances to 80' x1 with supervision  Pt is able to maintain NWB to R le 100% of the time  No gross lob noted  Fatigue and dizziness reported by pt after gait training session  NO stair training performed this session due to pt's BP being 183/119 and (+) dizziness with during gait training  Pt's BRENDA [place] informed  Pt returned to supine with ice to R ankle and anterior calf  Pt's crutches adjusted to correct height for pt's use of stairs ONLY at this time  Progress to stair climbing next PT session if medically appropriate    Recommend d/c to home once PT goals met and pt medically stable for D/C  Barriers to Discharge: Inaccessible home environment  Barriers to Discharge Comments: stairs  PT Discharge Recommendation: Return to previous environment with social support     PT - OK to Discharge: No(needs stair climbing and to achieve PT goals  )    See flowsheet documentation for full assessment

## 2021-02-09 NOTE — ASSESSMENT & PLAN NOTE
No active signs of bleeding   Unclear baseline no records to review   Could be aspect of anemia of chronic disease with likely underlying CKD   Outpatient workup with PCP

## 2021-02-09 NOTE — PROGRESS NOTES
Progress Note - Deep Stanley 1970, 53 y o  male MRN: [id number]  Unit/Bed#: [facility] 68 2 -01 Encounter: [id number]  Primary Care Provider: No primary care provider on file     Date and time admitted to hospital: 2/7/2021  7:19 PM    * Fracture of right fibula  Assessment & Plan  Mechanical fall on black ice with Right fibula fracture  Orthopedics consultation appreciated   Conservative treatment   Cam walker boot   PT consulted - return to home with social support   Non-weightbearing RLE with crutches or walker   Pain control   Follow up with Dr Sukumar Atkinson in 1 week for repeat xrays     Hypertensive urgency  Assessment & Plan  Likely in setting of underlying uncontrolled hypertension  I reviewed last few ED visits back to 2018 with blood pressures 170-190/110's   Had been on hydrochlorothiazide in the past but has not taken in over a year  Systolic Blood pressures as high as 245 in emergency department  Started on norvasc 5 mg once daily, increase to 10 mg daily today   added HCTZ 12 5 mg daily   Likely will need to increase HCTZ versus May need to add a third agent  UA reviewed   IV hydralazine prn   Needs to establish care with a PCP for close follow up   Low sodium diet           MITCHELL (acute kidney injury) ([facility] [address] )  Assessment & Plan  Unknown baseline creatinine  Likely pre renal as patient did report alcohol use over the weekend and little oral hydration  I suspect underlying CKD secondary to chronic hypertensive nephrosclerosis  POA Cr 1 7 --> 1 38 --> 1 43   BMP in AM   Should establish care with Nephrology outpatient         Normocytic anemia  Assessment & Plan  No active signs of bleeding   Unclear baseline no records to review   Could be aspect of anemia of chronic disease with likely underlying CKD   Outpatient workup with PCP       VTE Pharmacologic Prophylaxis:   Pharmacologic: Heparin  Mechanical VTE Prophylaxis in Place: Yes    Patient Centered Rounds: I have performed bedside rounds with nursing staff today  Discussions with Specialists or Other Care Team Provider:     Education and Discussions with Family / Patient: patient at the bedside     Time Spent for Care: 20 minutes  More than 50% of total time spent on counseling and coordination of care as described above  Current Length of Stay: 0 day(s)    Current Patient Status: Observation   Certification Statement: The patient will continue to require additional inpatient hospital stay due to hypertensive urgency     Discharge Plan: pendign improvement in BP     Code Status: Level 1 - Full Code      Subjective:   Patient doing well  Pain controlled currently  No chest pain, headache  No vision changes  Objective:     Vitals:   Temp (24hrs), Av 8 °F (36 6 °C), Min:97 5 °F (36 4 °C), Max:98 1 °F (36 7 °C)    Temp:  [97 5 °F (36 4 °C)-98 1 °F (36 7 °C)] 97 5 °F (36 4 °C)  HR:  [61-76] 76  Resp:  [17-18] 17  BP: (168-205)/() 168/122  SpO2:  [98 %-99 %] 98 %  There is no height or weight on file to calculate BMI  Input and Output Summary (last 24 hours): Intake/Output Summary (Last 24 hours) at 2021 1245  Last data filed at 2021 0401  Gross per 24 hour   Intake --   Output 400 ml   Net -400 ml       Physical Exam:     Physical Exam  Vitals signs and nursing note reviewed  Constitutional:       General: He is not in acute distress  Appearance: He is not ill-appearing  HENT:      Head: Normocephalic  Eyes:      Conjunctiva/sclera: Conjunctivae normal    Cardiovascular:      Rate and Rhythm: Normal rate and regular rhythm  Pulmonary:      Effort: Pulmonary effort is normal       Breath sounds: Normal breath sounds  Abdominal:      General: Bowel sounds are normal       Palpations: Abdomen is soft  Musculoskeletal:         General: Deformity (right leg cam boot present ) present  Skin:     General: Skin is warm  Neurological:      Mental Status: He is alert and oriented to person, place, and time  Psychiatric:         Mood and Affect: Mood normal          Additional Data:     Labs:    Results from last 7 days   Lab Units 02/08/21  0548 02/07/21 1954   WBC Thousand/uL 5 83 6 15   HEMOGLOBIN g/dL 10 5* 11 5*   HEMATOCRIT % 33 0* 36 9   PLATELETS Thousands/uL 223 262   NEUTROS PCT %  --  68   LYMPHS PCT %  --  24   MONOS PCT %  --  8   EOS PCT %  --  0     Results from last 7 days   Lab Units 02/09/21  0506  02/07/21 1954   SODIUM mmol/L 136   < > 139   POTASSIUM mmol/L 4 3   < > 3 8   CHLORIDE mmol/L 103   < > 104   CO2 mmol/L 29   < > 28   BUN mg/dL 12   < > 17   CREATININE mg/dL 1 43*   < > 1 72*   ANION GAP mmol/L 4   < > 7   CALCIUM mg/dL 8 6   < > 9 0   ALBUMIN g/dL  --   --  4 0   TOTAL BILIRUBIN mg/dL  --   --  0 33   ALK PHOS U/L  --   --  71   ALT U/L  --   --  24   AST U/L  --   --  22   GLUCOSE RANDOM mg/dL 92   < > 111    < > = values in this interval not displayed  Results from last 7 days   Lab Units 02/07/21 1954   INR  1 09                       * I Have Reviewed All Lab Data Listed Above  * Additional Pertinent Lab Tests Reviewed:  All Labs Within Last 24 Hours Reviewed    Imaging:    Imaging Reports Reviewed Today Include: reviewed  Imaging Personally Reviewed by Myself Includes:      Recent Cultures (last 7 days):           Last 24 Hours Medication List:   Current Facility-Administered Medications   Medication Dose Route Frequency Provider Last Rate    acetaminophen  975 mg Oral [facility] Augustina Yusuf DO      [START ON 2/10/2021] amLODIPine  10 mg Oral Daily Anais Raphael PA-C      docusate sodium  100 mg Oral BID Augustinaosman Yusuf DO      heparin (porcine)  5,000 Units Subcutaneous [facility] Augustina Yusuf DO      hydrALAZINE  10 mg Intravenous Q6H PRN Prem Burroughs PA-C      hydrochlorothiazide  12 5 mg Oral Daily Prem Burroughs PA-C      HYDROmorphone  0 5 mg Intravenous Q1H PRN Augustina Yusuf DO      hydrOXYzine HCL  25 mg Oral Q6H PRN Americo Alvares DO Can      oxyCODONE  10 mg Oral Q4H PRN Joe Guaman DO      oxyCODONE  5 mg Oral Q4H PRN Joe Guaman DO          Today, Patient Was Seen By: Hilda Jacinto PA-C    ** Please Note: Dictation voice to text software may have been used in the creation of this document   **

## 2021-02-09 NOTE — PLAN OF CARE
Problem: Potential for Falls  Goal: Patient will remain free of falls  Description: INTERVENTIONS:  - Assess patient frequently for physical needs  -  Identify cognitive and physical deficits and behaviors that affect risk of falls    -  [place] fall precautions as indicated by assessment   - Educate patient/family on patient safety including physical limitations  - Instruct patient to call for assistance with activity based on assessment  - Modify environment to reduce risk of injury  - Consider OT/PT consult to assist with strengthening/mobility  Outcome: Progressing     Problem: Prexisting or High Potential for Compromised Skin Integrity  Goal: Skin integrity is maintained or improved  Description: INTERVENTIONS:  - Identify patients at risk for skin breakdown  - Assess and monitor skin integrity  - Assess and monitor nutrition and hydration status  - Monitor labs   - Assess for incontinence   - Turn and reposition patient  - Assist with mobility/ambulation  - Relieve pressure over bony prominences  - Avoid friction and shearing  - Provide appropriate hygiene as needed including keeping skin clean and dry  - Evaluate need for skin moisturizer/barrier cream  - Collaborate with interdisciplinary team   - Patient/family teaching  - Consider wound care consult   Outcome: Progressing     Problem: PAIN - ADULT  Goal: Verbalizes/displays adequate comfort level or baseline comfort level  Description: Interventions:  - Encourage patient to monitor pain and request assistance  - Assess pain using appropriate pain scale  - Administer analgesics based on type and severity of pain and evaluate response  - Implement non-pharmacological measures as appropriate and evaluate response  - Consider cultural and social influences on pain and pain management  - Notify physician/advanced practitioner if interventions unsuccessful or patient reports new pain  Outcome: Progressing     Problem: INFECTION - ADULT  Goal: Absence or prevention of progression during hospitalization  Description: INTERVENTIONS:  - Assess and monitor for signs and symptoms of infection  - Monitor lab/diagnostic results  - Monitor all insertion sites, i e  indwelling lines, tubes, and drains  - Monitor endotracheal if appropriate and nasal secretions for changes in amount and color  - [place] appropriate cooling/warming therapies per order  - Administer medications as ordered  - Instruct and encourage patient and family to use good hand hygiene technique  - Identify and instruct in appropriate isolation precautions for identified infection/condition  Outcome: Progressing  Goal: Absence of fever/infection during neutropenic period  Description: INTERVENTIONS:  - Monitor WBC    Outcome: Progressing     Problem: SAFETY ADULT  Goal: Patient will remain free of falls  Description: INTERVENTIONS:  - Assess patient frequently for physical needs  -  Identify cognitive and physical deficits and behaviors that affect risk of falls    -  [place] fall precautions as indicated by assessment   - Educate patient/family on patient safety including physical limitations  - Instruct patient to call for assistance with activity based on assessment  - Modify environment to reduce risk of injury  - Consider OT/PT consult to assist with strengthening/mobility  Outcome: Progressing  Goal: Maintain or return to baseline ADL function  Description: INTERVENTIONS:  -  Assess patient's ability to carry out ADLs; assess patient's baseline for ADL function and identify physical deficits which impact ability to perform ADLs (bathing, care of mouth/teeth, toileting, grooming, dressing, etc )  - Assess/evaluate cause of self-care deficits   - Assess range of motion  - Assess patient's mobility; develop plan if impaired  - Assess patient's need for assistive devices and provide as appropriate  - Encourage maximum independence but intervene and supervise when necessary  - Involve family in performance of ADLs  - Assess for home care needs following discharge   - Consider OT consult to assist with ADL evaluation and planning for discharge  - Provide patient education as appropriate  Outcome: Progressing  Goal: Maintain or return mobility status to optimal level  Description: INTERVENTIONS:  - Assess patient's baseline mobility status (ambulation, transfers, stairs, etc )    - Identify cognitive and physical deficits and behaviors that affect mobility  - Identify mobility aids required to assist with transfers and/or ambulation (gait belt, sit-to-stand, lift, walker, cane, etc )  - [place] fall precautions as indicated by assessment  - Record patient progress and toleration of activity level on Mobility SBAR; progress patient to next Phase/Stage  - Instruct patient to call for assistance with activity based on assessment  - Consider rehabilitation consult to assist with strengthening/weightbearing, etc   Outcome: Progressing     Problem: DISCHARGE PLANNING  Goal: Discharge to home or other facility with appropriate resources  Description: INTERVENTIONS:  - Identify barriers to discharge w/patient and caregiver  - Arrange for needed discharge resources and transportation as appropriate  - Identify discharge learning needs (meds, wound care, etc )  - Arrange for interpretive services to assist at discharge as needed  - Refer to Case Management Department for coordinating discharge planning if the patient needs post-hospital services based on physician/advanced practitioner order or complex needs related to functional status, cognitive ability, or social support system  Outcome: Progressing     Problem: Knowledge Deficit  Goal: Patient/family/caregiver demonstrates understanding of disease process, treatment plan, medications, and discharge instructions  Description: Complete learning assessment and assess knowledge base    Interventions:  - Provide teaching at level of understanding  - Provide teaching via preferred learning methods  Outcome: Progressing     Problem: NEUROSENSORY - ADULT  Goal: Achieves stable or improved neurological status  Description: INTERVENTIONS  - Monitor and report changes in neurological status  - Monitor vital signs such as temperature, blood pressure, glucose, and any other labs ordered   - Initiate measures to prevent increased intracranial pressure  - Monitor for seizure activity and implement precautions if appropriate      Outcome: Progressing  Goal: Remains free of injury related to seizures activity  Description: INTERVENTIONS  - Maintain airway, patient safety  and administer oxygen as ordered  - Monitor patient for seizure activity, document and report duration and description of seizure to physician/advanced practitioner  - If seizure occurs,  ensure patient safety during seizure  - Reorient patient post seizure  - Seizure pads on all 4 side rails  - Instruct patient/family to notify RN of any seizure activity including if an aura is experienced  - Instruct patient/family to call for assistance with activity based on nursing assessment  - Administer anti-seizure medications if ordered    Outcome: Progressing  Goal: Achieves maximal functionality and self care  Description: INTERVENTIONS  - Monitor swallowing and airway patency with patient fatigue and changes in neurological status  - Encourage and assist patient to increase activity and self care     - Encourage visually impaired, hearing impaired and aphasic patients to use assistive/communication devices  Outcome: Progressing     Problem: CARDIOVASCULAR - ADULT  Goal: Maintains optimal cardiac output and hemodynamic stability  Description: INTERVENTIONS:  - Monitor I/O, vital signs and rhythm  - Monitor for S/S and trends of decreased cardiac output  - Administer and titrate ordered vasoactive medications to optimize hemodynamic stability  - Assess quality of pulses, skin color and temperature  - Assess for signs of decreased coronary artery perfusion  - Instruct patient to report change in severity of symptoms  Outcome: Progressing  Goal: Absence of cardiac dysrhythmias or at baseline rhythm  Description: INTERVENTIONS:  - Continuous cardiac monitoring, vital signs, obtain 12 lead EKG if ordered  - Administer antiarrhythmic and heart rate control medications as ordered  - Monitor electrolytes and administer replacement therapy as ordered  Outcome: Progressing     Problem: RESPIRATORY - ADULT  Goal: Achieves optimal ventilation and oxygenation  Description: INTERVENTIONS:  - Assess for changes in respiratory status  - Assess for changes in mentation and behavior  - Position to facilitate oxygenation and minimize respiratory effort  - Oxygen administered by appropriate delivery if ordered  - Initiate smoking cessation education as indicated  - Encourage broncho-pulmonary hygiene including cough, deep breathe, Incentive Spirometry  - Assess the need for suctioning and aspirate as needed  - Assess and instruct to report SOB or any respiratory difficulty  - Respiratory Therapy support as indicated  Outcome: Progressing     Problem: GASTROINTESTINAL - ADULT  Goal: Minimal or absence of nausea and/or vomiting  Description: INTERVENTIONS:  - Administer IV fluids if ordered to ensure adequate hydration  - Maintain NPO status until nausea and vomiting are resolved  - Nasogastric tube if ordered  - Administer ordered antiemetic medications as needed  - Provide nonpharmacologic comfort measures as appropriate  - Advance diet as tolerated, if ordered  - Consider nutrition services referral to assist patient with adequate nutrition and appropriate food choices  Outcome: Progressing  Goal: Maintains or returns to baseline bowel function  Description: INTERVENTIONS:  - Assess bowel function  - Encourage oral fluids to ensure adequate hydration  - Administer IV fluids if ordered to ensure adequate hydration  - Administer ordered medications as needed  - Encourage mobilization and activity  - Consider nutritional services referral to assist patient with adequate nutrition and appropriate food choices  Outcome: Progressing  Goal: Maintains adequate nutritional intake  Description: INTERVENTIONS:  - Monitor percentage of each meal consumed  - Identify factors contributing to decreased intake, treat as appropriate  - Assist with meals as needed  - Monitor I&O, weight, and lab values if indicated  - Obtain nutrition services referral as needed  Outcome: Progressing     Problem: GENITOURINARY - ADULT  Goal: Maintains or returns to baseline urinary function  Description: INTERVENTIONS:  - Assess urinary function  - Encourage oral fluids to ensure adequate hydration if ordered  - Administer IV fluids as ordered to ensure adequate hydration  - Administer ordered medications as needed  - Offer frequent toileting  - Follow urinary retention protocol if ordered  Outcome: Progressing  Goal: Absence of urinary retention  Description: INTERVENTIONS:  - Assess patients ability to void and empty bladder  - Monitor I/O  - Bladder scan as needed  - Discuss with physician/AP medications to alleviate retention as needed  - Discuss catheterization for long term situations as appropriate  Outcome: Progressing     Problem: METABOLIC, FLUID AND ELECTROLYTES - ADULT  Goal: Electrolytes maintained within normal limits  Description: INTERVENTIONS:  - Monitor labs and assess patient for signs and symptoms of electrolyte imbalances  - Administer electrolyte replacement as ordered  - Monitor response to electrolyte replacements, including repeat lab results as appropriate  - Instruct patient on fluid and nutrition as appropriate  Outcome: Progressing  Goal: Fluid balance maintained  Description: INTERVENTIONS:  - Monitor labs   - Monitor I/O and WT  - Instruct patient on fluid and nutrition as appropriate  - Assess for signs & symptoms of volume excess or deficit  Outcome: Progressing  Goal: Glucose maintained within target range  Description: INTERVENTIONS:  - Monitor Blood Glucose as ordered  - Assess for signs and symptoms of hyperglycemia and hypoglycemia  - Administer ordered medications to maintain glucose within target range  - Assess nutritional intake and initiate nutrition service referral as needed  Outcome: Progressing     Problem: SKIN/TISSUE INTEGRITY - ADULT  Goal: Skin integrity remains intact  Description: INTERVENTIONS  - Identify patients at risk for skin breakdown  - Assess and monitor skin integrity  - Assess and monitor nutrition and hydration status  - Monitor labs (i e  albumin)  - Assess for incontinence   - Turn and reposition patient  - Assist with mobility/ambulation  - Relieve pressure over bony prominences  - Avoid friction and shearing  - Provide appropriate hygiene as needed including keeping skin clean and dry  - Evaluate need for skin moisturizer/barrier cream  - Collaborate with interdisciplinary team (i e  Nutrition, Rehabilitation, etc )   - Patient/family teaching  Outcome: Progressing  Goal: Incision(s), wounds(s) or drain site(s) healing without S/S of infection  Description: INTERVENTIONS  - Assess and document risk factors for skin impairment   - Assess and document dressing, incision, wound bed, drain sites and surrounding tissue  - Consider nutrition services referral as needed  - Oral mucous membranes remain intact  - Provide patient/ family education  Outcome: Progressing  Goal: Oral mucous membranes remain intact  Description: INTERVENTIONS  - Assess oral mucosa and hygiene practices  - Implement preventative oral hygiene regimen  - Implement oral medicated treatments as ordered  - Initiate Nutrition services referral as needed  Outcome: Progressing     Problem: HEMATOLOGIC - ADULT  Goal: Maintains hematologic stability  Description: INTERVENTIONS  - Assess for signs and symptoms of bleeding or hemorrhage  - Monitor labs  - Administer supportive blood products/factors as ordered and appropriate  Outcome: Progressing     Problem: MUSCULOSKELETAL - ADULT  Goal: Maintain or return mobility to safest level of function  Description: INTERVENTIONS:  - Assess patient's ability to carry out ADLs; assess patient's baseline for ADL function and identify physical deficits which impact ability to perform ADLs (bathing, care of mouth/teeth, toileting, grooming, dressing, etc )  - Assess/evaluate cause of self-care deficits   - Assess range of motion  - Assess patient's mobility  - Assess patient's need for assistive devices and provide as appropriate  - Encourage maximum independence but intervene and supervise when necessary  - Involve family in performance of ADLs  - Assess for home care needs following discharge   - Consider OT consult to assist with ADL evaluation and planning for discharge  - Provide patient education as appropriate  Outcome: Progressing  Goal: Maintain proper alignment of affected body part  Description: INTERVENTIONS:  - Support, maintain and protect limb and body alignment  - Provide patient/ family with appropriate education  Outcome: Progressing

## 2021-02-09 NOTE — ASSESSMENT & PLAN NOTE
Unknown baseline creatinine  Likely pre renal as patient did report alcohol use over the weekend and little oral hydration  I suspect underlying CKD secondary to chronic hypertensive nephrosclerosis  POA Cr 1 7 --> 1 38 --> 1 43 --> 1 50   BMP in AM   See workup above

## 2021-02-09 NOTE — ASSESSMENT & PLAN NOTE
Likely in setting of underlying uncontrolled hypertension  I reviewed last few ED visits back to 2018 with blood pressures 170-190/110's   Had been on hydrochlorothiazide in the past but has not taken in over a year  Systolic Blood pressures as high as 245 in emergency department  Started on norvasc 5 mg once daily, increase to 10 mg daily today   added HCTZ 12 5 mg daily   Likely will need to increase HCTZ versus May need to add a third agent  UA reviewed   IV hydralazine prn   Needs to establish care with a PCP for close follow up   Low sodium diet

## 2021-02-09 NOTE — UTILIZATION REVIEW
OBSERVATION 2/7/21 @ 2144 CONVERTED TO INPATIENT 2/9/21 @ 1449 DUE TO  UNCONTROLLED BP REQUIRING IV MEDICATIONS  Continued Stay Review  02/09/21 1449  Inpatient Admission Once     Question Answer Comment   Level of Care Med Surg    Estimated length of stay More than 2 Midnights    Certification I certify that inpatient services are medically necessary for this patient for a duration of greater than two midnights  See H&P and MD Progress Notes for additional information about the patient's course of treatment  02/09/21 1449       Date: 2/9/21                       Current Patient Class: Obs/to inpatient   Current Level of Care: MEd/surg     HPI:50 y o  male initially admitted on 2/9     Assessment/Plan: Continues to require IV hydralazine for elevated bp  Creat remains elevated  Recheck  Continue with walking boot   Per PT/OT, not safe for DC       Pertinent Labs/Diagnostic Results:   Vital Signs    Date/Time  Temp  Pulse  Resp  BP  MAP (mmHg)  SpO2  O2 Device  Patient Position - Orthostatic VS   02/09/21 1404  --  --  --  162/102Abnormal   --  --  --  Lying   02/09/21 1056  --  --  --  168/122Abnormal   --  --  --  Lying   02/09/21 08:49:47  --  --  --  189/120Abnormal   143  --  --  --   02/09/21 07:48:22  97 5 °F (36 4 °C)  76  17  190/112Abnormal   138  98 %  None (Room air)  --   02/09/21 0456  --  --  --  180/90Abnormal    --  --  --  --   BP: gave 15mg of labetolol at 02/09/21 0456   02/09/21 0217  --  --  --  170/86                   Results from last 7 days   Lab Units 02/08/21  0548 02/07/21  1954   WBC Thousand/uL 5 83 6 15   HEMOGLOBIN g/dL 10 5* 11 5*   HEMATOCRIT % 33 0* 36 9   PLATELETS Thousands/uL 223 262   NEUTROS ABS Thousands/µL  --  4 12         Results from last 7 days   Lab Units 02/09/21  0506 02/08/21  0548 02/07/21 1954   SODIUM mmol/L 136 139 139   POTASSIUM mmol/L 4 3 3 9 3 8   CHLORIDE mmol/L 103 105 104   CO2 mmol/L 29 24 28   ANION GAP mmol/L 4 10 7   BUN mg/dL 12 15 17 CREATININE mg/dL 1 43* 1 38* 1 72*   EGFR ml/min/1 73sq m 66 69 53   CALCIUM mg/dL 8 6 8 4 9 0     Results from last 7 days   Lab Units 02/07/21 1954   AST U/L 22   ALT U/L 24   ALK PHOS U/L 71   TOTAL PROTEIN g/dL 8 4*   ALBUMIN g/dL 4 0   TOTAL BILIRUBIN mg/dL 0 33         Results from last 7 days   Lab Units 02/09/21  0506 02/08/21  0548 02/07/21 1954   GLUCOSE RANDOM mg/dL 92 95 111       Results from last 7 days   Lab Units 02/07/21 1954   CK TOTAL U/L 467*   CK MB INDEX % <1 0   CK MB ng/mL 1 0     Results from last 7 days   Lab Units 02/07/21 2016   TROPONIN I ng/mL <0 02         Results from last 7 days   Lab Units 02/07/21 1954   PROTIME seconds 13 9   INR  1 09   PTT seconds 28         Results from last 7 days   Lab Units 02/08/21  0559   CLARITY UA  Clear   COLOR UA  Yellow   SPEC GRAV UA  >=1 030   PH UA  5 5   GLUCOSE UA mg/dl Negative   KETONES UA mg/dl Negative   BLOOD UA  Negative   PROTEIN UA mg/dl Negative   NITRITE UA  Negative   BILIRUBIN UA  Negative   UROBILINOGEN UA E U /dl 0 2   LEUKOCYTES UA  Negative   WBC UA /hpf 0-1*   RBC UA /hpf None Seen   BACTERIA UA /hpf Occasional   EPITHELIAL CELLS WET PREP /hpf Occasional         Medications:   Scheduled Medications:  acetaminophen, 975 mg, Oral, Q8H [address]  [START ON 2/10/2021] amLODIPine, 10 mg, Oral, Daily  docusate sodium, 100 mg, Oral, BID  heparin (porcine), 5,000 Units, Subcutaneous, Q8H MELANIE  hydrochlorothiazide, 12 5 mg, Oral, Daily  lisinopril, 5 mg, Oral, Daily      Continuous IV Infusions:     PRN Meds:  hydrALAZINE, 10 mg, Intravenous, Q6H PRN x1  HYDROmorphone, 0 5 mg, Intravenous, Q1H PRN x3  hydrOXYzine HCL, 25 mg, Oral, Q6H PRN  oxyCODONE, 10 mg, Oral, Q4H PRN x5  oxyCODONE, 5 mg, Oral, Q4H PRN        Discharge Plan: D    Network Utilization Review Department  ATTENTION: Please call with any questions or concerns to [phone] and carefully listen to the prompts so that you are directed to the right person   All voicemails are confidential   Luly Mcgregor all requests for admission clinical reviews, approved or denied determinations and any other requests to dedicated fax number below belonging to the campus where the patient is receiving treatment   List of dedicated fax numbers for the Facilities:  [address] DENIALS (Administrative/Medical Necessity) [phone]   [address] (Maternity/NICU/Pediatrics) [phone] [address] [facility] [address] Padma Nelson 9512 (  Kirill Griggs "[address]) [address] [place] [address] [address] Anahi Self 14[address] [place], [place] [address] [phone]

## 2021-02-09 NOTE — PHYSICAL THERAPY NOTE
Physical Therapy Treatment Note       02/09/21 1535   Note Type   Note Type Treatment   Pain Assessment   Pain Assessment Tool 0-10   Pain Score Worst Possible Pain   Pain Location/Orientation Orientation: Right;Location: Leg   Hospital Pain Intervention(s) Cold applied;Repositioned; Ambulation/increased activity; Elevated   Restrictions/Precautions   RLE Weight Bearing Per Order NWB   Braces or Orthoses CAM Boot   Other Precautions WBS; Fall Risk;Pain   General   Chart Reviewed Yes   Family/Caregiver Present No   Cognition   Overall Cognitive Status WFL   Arousal/Participation Alert; Responsive; Cooperative   Attention Within functional limits   Orientation Level Oriented X4   Memory Within functional limits   Following Commands Follows all commands and directions without difficulty   Subjective   Subjective pt reports increased swelling of right leg and ankle  Bed Mobility   Supine to Sit 6  Modified independent   Additional items Increased time required   Sit to Supine 7  Independent   Transfers   Sit to Stand 5  Supervision   Additional items Assist x 1;Verbal cues   Stand to Sit 5  Supervision   Additional items Assist x 1;Verbal cues   Ambulation/Elevation   Gait pattern   (hop to gait pattern  )   Gait Assistance 5  Supervision   Assistive Device Rolling walker   Distance 90' x1   Stair Management Assistance Not tested  (due to bp 183/119 and reports od dizzness post gait training)   Balance   Static Sitting Normal   Dynamic Sitting Good   Static Standing Good  ( rw)   Dynamic Standing Fair +  (rw)   Ambulatory Fair +  (rw)   Activity Tolerance   Activity Tolerance Patient limited by fatigue;Treatment limited secondary to medical complications (Comment)  (bp 183/119 (+) dizziness reported during ambulation)   Medical Staff Made Aware Rosa Elena GUTIERREZ   Nurse Made Aware yes   Exercises   Quad Sets 10 reps;Bilateral;Supine   Heelslides Supine;10 reps;AROM; Right   Hip Flexion Supine;10 reps;AROM; Right  (slr)   Hip Abduction Supine;10 reps;AROM; Right   Knee AROM Short Arc Quad Supine;10 reps;AROM   Knee AROM Long Arc Quad Sitting;10 reps;AROM; Right   Ankle Pumps Supine;10 reps;AROM; Left   Equipment Use   Comments Pt ed on le HEP, pt issued writtedn HEP reviewd and performed with pt   pt  dons and doff cam boot with supervision with verbal cues for correct donning and doffing  Assessment   Prognosis Good   Problem List Decreased strength;Decreased endurance; Impaired balance;Decreased range of motion;Decreased mobility;Orthopedic restrictions;Pain   Assessment Pt  Seen for PT interventions as per POC  Pt  Performs supine and seated le arom exercises x 10 reps  with verbal cues for correct performance  Pt tolerated well  Pt dons/ doffs cam boot with supervision with moderate verbal cues for correct donning and doffing techniques  Pt  Progressed with mobility training, less assistance for all mobility  Pt  Performs bed mobility supine to sit with mod I and sit to supine I, transfers with supervision with verbal cues for proper hand placement and safe approach to bed  Pt  Progressed with ambulation distances to 80' x1 with supervision  Pt is able to maintain NWB to R le 100% of the time  No gross lob noted  Fatigue and dizziness reported by pt after gait training session  NO stair training performed this session due to pt's BP being 183/119 and (+) dizziness with during gait training  Pt's BRENDA [place] informed  Pt returned to supine with ice to R ankle and anterior calf  Pt's crutches adjusted to correct height for pt's use of stairs ONLY at this time  Progress to stair climbing next PT session if medically appropriate  Recommend d/c to home once PT goals met and pt medically stable for D/C  Goals   Patient Goals " To go home "     Short Term Goal #1 2 18   PT Treatment Day 1   Plan   Treatment/Interventions Functional transfer training;LE strengthening/ROM; Therapeutic exercise; Endurance training;Patient/family training;Equipment eval/education; Bed mobility;Gait training;Spoke to nursing   Progress Slow progress, medical status limitations   PT Frequency   (4-6x/week)   Recommendation   PT Discharge Recommendation Return to previous environment with social support   PT - OK to Discharge No  (needs stair climbing and to achieve PT goals  )        02/09/21 1535   Note Type   Note Type Treatment   Pain Assessment   Pain Assessment Tool 0-10   Pain Score Worst Possible Pain   Pain Location/Orientation Orientation: Right;Location: Leg   Hospital Pain Intervention(s) Cold applied;Repositioned; Ambulation/increased activity; Elevated   Restrictions/Precautions   RLE Weight Bearing Per Order NWB   Braces or Orthoses CAM Boot   Other Precautions WBS; Fall Risk;Pain   General   Chart Reviewed Yes   Family/Caregiver Present No   Cognition   Overall Cognitive Status WFL   Arousal/Participation Alert; Responsive; Cooperative   Attention Within functional limits   Orientation Level Oriented X4   Memory Within functional limits   Following Commands Follows all commands and directions without difficulty   Subjective   Subjective pt reports increased swelling of right leg and ankle       Bed Mobility   Supine to Sit 6  Modified independent   Additional items Increased time required   Sit to Supine 7  Independent   Transfers   Sit to Stand 5  Supervision   Additional items Assist x 1;Verbal cues   Stand to Sit 5  Supervision   Additional items Assist x 1;Verbal cues   Ambulation/Elevation   Gait pattern   (hop to gait pattern  )   Gait Assistance 5  Supervision   Assistive Device Rolling walker   Distance 90' x1   Stair Management Assistance Not tested  (due to bp 183/119 and reports od dizzness post gait training)   Balance   Static Sitting Normal   Dynamic Sitting Good   Static Standing Good  ( rw)   Dynamic Standing Fair +  (rw)   Ambulatory Fair +  (rw)   Activity Tolerance   Activity Tolerance Patient limited by fatigue;Treatment limited secondary to medical complications (Comment)  (bp 183/119 (+) dizziness reported during ambulation)   Medical Staff Made Aware Rosa Elena GUTIERREZ   Nurse Made Aware yes   Exercises   Quad Sets 10 reps;Bilateral;Supine   Heelslides Supine;10 reps;AROM; Right   Hip Flexion Supine;10 reps;AROM; Right  (slr)   Hip Abduction Supine;10 reps;AROM; Right   Knee AROM Short Arc Quad Supine;10 reps;AROM   Knee AROM Long Arc Quad Sitting;10 reps;AROM; Right   Ankle Pumps Supine;10 reps;AROM; Left   Equipment Use   Comments Pt ed on le HEP, pt issued writtedn HEP reviewd and performed with pt   pt  dons and doff cam boot with supervision with verbal cues for correct donning and doffing  Assessment   Prognosis Good   Problem List Decreased strength;Decreased endurance; Impaired balance;Decreased range of motion;Decreased mobility;Orthopedic restrictions;Pain   Assessment Pt  Seen for PT interventions as per POC  Pt  Performs supine and seated le arom exercises x 10 reps  with verbal cues for correct performance  Pt tolerated well  Pt dons/ doffs cam boot with supervision with moderate verbal cues for correct donning and doffing techniques  Pt  Progressed with mobility training, less assistance for all mobility  Pt  Performs bed mobility supine to sit with mod I and sit to supine I, transfers with supervision with verbal cues for proper hand placement and safe approach to bed  Pt  Progressed with ambulation distances to 80' x1 with supervision  Pt is able to maintain NWB to R le 100% of the time  No gross lob noted  Fatigue and dizziness reported by pt after gait training session  NO stair training performed this session due to pt's BP being 183/119 and (+) dizziness with during gait training  Pt's BRENDA [place] informed  Pt returned to supine with ice to R ankle and anterior calf  Pt's crutches adjusted to correct height for pt's use of stairs ONLY at this time  Progress to stair climbing next PT session if medically appropriate  Recommend d/c to home once PT goals met and pt medically stable for D/C  Goals   Patient Goals " To go home "     Short Term Goal #1 2 18   PT Treatment Day 1   Plan   Treatment/Interventions Functional transfer training;LE strengthening/ROM; Therapeutic exercise; Endurance training;Patient/family training;Equipment eval/education; Bed mobility;Gait training;Spoke to nursing   Progress Slow progress, medical status limitations   PT Frequency   (4-6x/week)   Recommendation   PT Discharge Recommendation Return to previous environment with social support   PT - OK to Discharge No  (needs stair climbing and to achieve PT goals  )       Alec Samano, PTA

## 2021-02-09 NOTE — ASSESSMENT & PLAN NOTE
Mechanical fall on black ice with Right fibula fracture  Orthopedics consultation appreciated   Conservative treatment   Cam walker boot   PT consulted - return to home with social support   Non-weightbearing RLE with crutches or walker   Pain control   Follow up with Dr Anita Macdonald in 1 week for repeat xrays

## 2021-02-10 ENCOUNTER — APPOINTMENT (INPATIENT)
Dept: ULTRASOUND IMAGING | Facility: HOSPITAL | Age: 51
DRG: 563 | End: 2021-02-10

## 2021-02-10 DIAGNOSIS — N17.9 AKI (ACUTE KIDNEY INJURY) (HCC): Primary | ICD-10-CM

## 2021-02-10 DIAGNOSIS — I16.0 HYPERTENSIVE URGENCY: ICD-10-CM

## 2021-02-10 PROBLEM — E87.1 HYPONATREMIA: Status: ACTIVE | Noted: 2021-02-10

## 2021-02-10 LAB
ANION GAP SERPL CALCULATED.3IONS-SCNC: 9 MMOL/L (ref 4–13)
BUN SERPL-MCNC: 17 MG/DL (ref 5–25)
CALCIUM SERPL-MCNC: 9.4 MG/DL (ref 8.3–10.1)
CHLORIDE SERPL-SCNC: 96 MMOL/L (ref 100–108)
CO2 SERPL-SCNC: 26 MMOL/L (ref 21–32)
CREAT SERPL-MCNC: 1.5 MG/DL (ref 0.6–1.3)
GFR SERPL CREATININE-BSD FRML MDRD: 62 ML/MIN/1.73SQ M
GLUCOSE SERPL-MCNC: 109 MG/DL (ref 65–140)
POTASSIUM SERPL-SCNC: 4 MMOL/L (ref 3.5–5.3)
SODIUM SERPL-SCNC: 131 MMOL/L (ref 136–145)

## 2021-02-10 PROCEDURE — 99232 SBSQ HOSP IP/OBS MODERATE 35: CPT | Performed by: PHYSICIAN ASSISTANT

## 2021-02-10 PROCEDURE — 76770 US EXAM ABDO BACK WALL COMP: CPT

## 2021-02-10 PROCEDURE — 99253 IP/OBS CNSLTJ NEW/EST LOW 45: CPT | Performed by: INTERNAL MEDICINE

## 2021-02-10 PROCEDURE — 80048 BASIC METABOLIC PNL TOTAL CA: CPT | Performed by: PHYSICIAN ASSISTANT

## 2021-02-10 RX ORDER — HYDRALAZINE HYDROCHLORIDE 25 MG/1
25 TABLET, FILM COATED ORAL EVERY 8 HOURS PRN
Status: DISCONTINUED | OUTPATIENT
Start: 2021-02-10 | End: 2021-02-11 | Stop reason: HOSPADM

## 2021-02-10 RX ORDER — AMLODIPINE BESYLATE 10 MG/1
10 TABLET ORAL DAILY
Status: DISCONTINUED | OUTPATIENT
Start: 2021-02-10 | End: 2021-02-11 | Stop reason: HOSPADM

## 2021-02-10 RX ORDER — CARVEDILOL 6.25 MG/1
12.5 TABLET ORAL 2 TIMES DAILY WITH MEALS
Status: DISCONTINUED | OUTPATIENT
Start: 2021-02-10 | End: 2021-02-11 | Stop reason: HOSPADM

## 2021-02-10 RX ORDER — LISINOPRIL 20 MG/1
20 TABLET ORAL DAILY
Status: DISCONTINUED | OUTPATIENT
Start: 2021-02-10 | End: 2021-02-11 | Stop reason: HOSPADM

## 2021-02-10 RX ADMIN — CARVEDILOL 12.5 MG: 6.25 TABLET, FILM COATED ORAL at 15:35

## 2021-02-10 RX ADMIN — AMLODIPINE BESYLATE 10 MG: 10 TABLET ORAL at 08:52

## 2021-02-10 RX ADMIN — HYDROXYZINE HYDROCHLORIDE 25 MG: 25 TABLET ORAL at 10:50

## 2021-02-10 RX ADMIN — HEPARIN SODIUM 5000 UNITS: 5000 INJECTION INTRAVENOUS; SUBCUTANEOUS at 13:16

## 2021-02-10 RX ADMIN — HEPARIN SODIUM 5000 UNITS: 5000 INJECTION INTRAVENOUS; SUBCUTANEOUS at 05:13

## 2021-02-10 RX ADMIN — HEPARIN SODIUM 5000 UNITS: 5000 INJECTION INTRAVENOUS; SUBCUTANEOUS at 21:42

## 2021-02-10 RX ADMIN — HYDROCHLOROTHIAZIDE 12.5 MG: 12.5 TABLET ORAL at 08:52

## 2021-02-10 RX ADMIN — ACETAMINOPHEN 975 MG: 325 TABLET ORAL at 05:13

## 2021-02-10 RX ADMIN — DOCUSATE SODIUM 100 MG: 100 CAPSULE, LIQUID FILLED ORAL at 17:10

## 2021-02-10 RX ADMIN — DOCUSATE SODIUM 100 MG: 100 CAPSULE, LIQUID FILLED ORAL at 08:52

## 2021-02-10 RX ADMIN — OXYCODONE HYDROCHLORIDE 10 MG: 10 TABLET ORAL at 23:47

## 2021-02-10 RX ADMIN — ACETAMINOPHEN 975 MG: 325 TABLET ORAL at 21:42

## 2021-02-10 RX ADMIN — LISINOPRIL 20 MG: 20 TABLET ORAL at 10:50

## 2021-02-10 RX ADMIN — ACETAMINOPHEN 975 MG: 325 TABLET ORAL at 13:16

## 2021-02-10 NOTE — PROGRESS NOTES
Progress Note -  Pack 1970, 53 y o  male MRN: [id number]  Unit/Bed#: [facility] 2 -01 Encounter: [id number]  Primary Care Provider: No primary care provider on file     Date and time admitted to hospital: 2/7/2021  7:19 PM    * Fracture of right fibula  Assessment & Plan  Mechanical fall on black ice with Right fibula fracture  Orthopedics consultation appreciated   Conservative treatment   Cam walker boot   PT consulted - return to home with social support   Non-weightbearing RLE with crutches or walker   Pain control   Follow up with Dr Raymundo Hansen in 1 week for repeat xrays     Hypertensive urgency  Assessment & Plan  Likely in setting of underlying uncontrolled hypertension  I reviewed last few ED visits back to 2018 with blood pressures 170-190/110's   Had been on hydrochlorothiazide in the past but has not taken in over a year  Systolic Blood pressures as high as 245 in emergency department  Nephrology consulted due to resistant hypertension   Checking renal ultrasound   Checking plasma metanephrines, renin, aldosterone   Spoke with Nephrology, has an appointment outpatient on Tuesday for follow up   Patient is now agreeable to stay today to monitor BP   Antihypertensive regimen now: norvasc 10 mg daily, coreg 12 5 bid and lisinopril 20 mg   Stop HCTZ   BMP in AM           MITCHELL (acute kidney injury) ([facility] [address] )  Assessment & Plan  Unknown baseline creatinine  Likely pre renal as patient did report alcohol use over the weekend and little oral hydration  I suspect underlying CKD secondary to chronic hypertensive nephrosclerosis  POA Cr 1 7 --> 1 38 --> 1 43 --> 1 50   BMP in AM   See workup above         Normocytic anemia  Assessment & Plan  No active signs of bleeding   Unclear baseline no records to review   Could be aspect of anemia of chronic disease with likely underlying CKD   Outpatient workup with PCP     Hyponatremia  Assessment & Plan  Stop HCTZ   Check BMP in AM       VTE Pharmacologic Prophylaxis:   Pharmacologic: Heparin  Mechanical VTE Prophylaxis in Place: No    Patient Centered Rounds: I have performed bedside rounds with nursing staff today  Discussions with Specialists or Other Care Team Provider: Nephrology     Education and Discussions with Family / Patient: patient at the bedside, tried to call significant other no anwer I did leave voice message     Time Spent for Care: 20 minutes  More than 50% of total time spent on counseling and coordination of care as described above  Current Length of Stay: 1 day(s)    Current Patient Status: Inpatient   Certification Statement: The patient will continue to require additional inpatient hospital stay due to hypertensive urgency     Discharge Plan: pending improvement in BP     Code Status: Level 1 - Full Code      Subjective:   Patient was frustrated this morning that he can not go home  nephroogy and I discussed plan with him at length and he is now agreeable to staying  No acute complaints  Swelling in right ankle going down with ice pack  No chest pain or other acute complaints  Objective:     Vitals:   Temp (24hrs), Av 7 °F (36 5 °C), Min:97 1 °F (36 2 °C), Max:98 4 °F (36 9 °C)    Temp:  [97 1 °F (36 2 °C)-98 4 °F (36 9 °C)] 98 4 °F (36 9 °C)  HR:  [71-89] 71  Resp:  [16-20] 16  BP: (138-184)/() 144/102  SpO2:  [91 %-100 %] 100 %  Body mass index is 26 66 kg/m²  Input and Output Summary (last 24 hours): Intake/Output Summary (Last 24 hours) at 2/10/2021 1237  Last data filed at 2/10/2021 0901  Gross per 24 hour   Intake 600 ml   Output 3525 ml   Net -2925 ml       Physical Exam:     Physical Exam  Vitals signs and nursing note reviewed  Constitutional:       General: He is not in acute distress  Appearance: He is not ill-appearing  HENT:      Head: Normocephalic  Eyes:      Conjunctiva/sclera: Conjunctivae normal    Cardiovascular:      Rate and Rhythm: Normal rate and regular rhythm     Pulmonary: Effort: Pulmonary effort is normal       Breath sounds: Normal breath sounds  Abdominal:      General: Bowel sounds are normal       Palpations: Abdomen is soft  Musculoskeletal:         General: Tenderness (right ankle ) and deformity (cam boot removed with ice pack to right foot ) present  Neurological:      Mental Status: He is alert and oriented to person, place, and time  Psychiatric:         Mood and Affect: Mood normal            Additional Data:     Labs:    Results from last 7 days   Lab Units 02/08/21  0548 02/07/21 1954   WBC Thousand/uL 5 83 6 15   HEMOGLOBIN g/dL 10 5* 11 5*   HEMATOCRIT % 33 0* 36 9   PLATELETS Thousands/uL 223 262   NEUTROS PCT %  --  68   LYMPHS PCT %  --  24   MONOS PCT %  --  8   EOS PCT %  --  0     Results from last 7 days   Lab Units 02/10/21  0457  02/07/21 1954   SODIUM mmol/L 131*   < > 139   POTASSIUM mmol/L 4 0   < > 3 8   CHLORIDE mmol/L 96*   < > 104   CO2 mmol/L 26   < > 28   BUN mg/dL 17   < > 17   CREATININE mg/dL 1 50*   < > 1 72*   ANION GAP mmol/L 9   < > 7   CALCIUM mg/dL 9 4   < > 9 0   ALBUMIN g/dL  --   --  4 0   TOTAL BILIRUBIN mg/dL  --   --  0 33   ALK PHOS U/L  --   --  71   ALT U/L  --   --  24   AST U/L  --   --  22   GLUCOSE RANDOM mg/dL 109   < > 111    < > = values in this interval not displayed  Results from last 7 days   Lab Units 02/07/21 1954   INR  1 09                       * I Have Reviewed All Lab Data Listed Above  * Additional Pertinent Lab Tests Reviewed:  All Labs Within Last 24 Hours Reviewed    Imaging:    Imaging Reports Reviewed Today Include: pendign renal US  Imaging Personally Reviewed by Myself Includes:      Recent Cultures (last 7 days):           Last 24 Hours Medication List:   Current Facility-Administered Medications   Medication Dose Route Frequency Provider Last Rate    acetaminophen  975 mg Oral [facility] Edel Caceres DO      amLODIPine  10 mg Oral Daily MUSA Parker      carvedilol  12 5 mg Oral BID With Meals MUSA Parker      docusate sodium  100 mg Oral BID Danny Medel, DO      heparin (porcine)  5,000 Units Subcutaneous [facility] Danny Medel, DO      hydrALAZINE  25 mg Oral Q8H PRN Isabel Go MD      HYDROmorphone  0 5 mg Intravenous Q1H PRN Danny Medel, DO      hydrOXYzine HCL  25 mg Oral Q6H PRN Danny Medel, DO      lisinopril  20 mg Oral Daily MUSA Parker      oxyCODONE  10 mg Oral Q4H PRN Danny Medel, DO      oxyCODONE  5 mg Oral Q4H PRN Danny Medel, DO          Today, Patient Was Seen By: Juil Garner PA-C    ** Please Note: Dictation voice to text software may have been used in the creation of this document   **

## 2021-02-10 NOTE — PLAN OF CARE
Problem: Potential for Falls  Goal: Patient will remain free of falls  Description: INTERVENTIONS:  - Assess patient frequently for physical needs  -  Identify cognitive and physical deficits and behaviors that affect risk of falls    -  [place] fall precautions as indicated by assessment   - Educate patient/family on patient safety including physical limitations  - Instruct patient to call for assistance with activity based on assessment  - Modify environment to reduce risk of injury  - Consider OT/PT consult to assist with strengthening/mobility  Outcome: Progressing     Problem: Prexisting or High Potential for Compromised Skin Integrity  Goal: Skin integrity is maintained or improved  Description: INTERVENTIONS:  - Identify patients at risk for skin breakdown  - Assess and monitor skin integrity  - Assess and monitor nutrition and hydration status  - Monitor labs   - Assess for incontinence   - Turn and reposition patient  - Assist with mobility/ambulation  - Relieve pressure over bony prominences  - Avoid friction and shearing  - Provide appropriate hygiene as needed including keeping skin clean and dry  - Evaluate need for skin moisturizer/barrier cream  - Collaborate with interdisciplinary team   - Patient/family teaching  - Consider wound care consult   Outcome: Progressing     Problem: PAIN - ADULT  Goal: Verbalizes/displays adequate comfort level or baseline comfort level  Description: Interventions:  - Encourage patient to monitor pain and request assistance  - Assess pain using appropriate pain scale  - Administer analgesics based on type and severity of pain and evaluate response  - Implement non-pharmacological measures as appropriate and evaluate response  - Consider cultural and social influences on pain and pain management  - Notify physician/advanced practitioner if interventions unsuccessful or patient reports new pain  Outcome: Progressing     Problem: INFECTION - ADULT  Goal: Absence or prevention of progression during hospitalization  Description: INTERVENTIONS:  - Assess and monitor for signs and symptoms of infection  - Monitor lab/diagnostic results  - Monitor all insertion sites, i e  indwelling lines, tubes, and drains  - Monitor endotracheal if appropriate and nasal secretions for changes in amount and color  - [place] appropriate cooling/warming therapies per order  - Administer medications as ordered  - Instruct and encourage patient and family to use good hand hygiene technique  - Identify and instruct in appropriate isolation precautions for identified infection/condition  Outcome: Progressing  Goal: Absence of fever/infection during neutropenic period  Description: INTERVENTIONS:  - Monitor WBC    Outcome: Progressing     Problem: SAFETY ADULT  Goal: Patient will remain free of falls  Description: INTERVENTIONS:  - Assess patient frequently for physical needs  -  Identify cognitive and physical deficits and behaviors that affect risk of falls    -  [place] fall precautions as indicated by assessment   - Educate patient/family on patient safety including physical limitations  - Instruct patient to call for assistance with activity based on assessment  - Modify environment to reduce risk of injury  - Consider OT/PT consult to assist with strengthening/mobility  Outcome: Progressing  Goal: Maintain or return to baseline ADL function  Description: INTERVENTIONS:  -  Assess patient's ability to carry out ADLs; assess patient's baseline for ADL function and identify physical deficits which impact ability to perform ADLs (bathing, care of mouth/teeth, toileting, grooming, dressing, etc )  - Assess/evaluate cause of self-care deficits   - Assess range of motion  - Assess patient's mobility; develop plan if impaired  - Assess patient's need for assistive devices and provide as appropriate  - Encourage maximum independence but intervene and supervise when necessary  - Involve family in performance of ADLs  - Assess for home care needs following discharge   - Consider OT consult to assist with ADL evaluation and planning for discharge  - Provide patient education as appropriate  Outcome: Progressing  Goal: Maintain or return mobility status to optimal level  Description: INTERVENTIONS:  - Assess patient's baseline mobility status (ambulation, transfers, stairs, etc )    - Identify cognitive and physical deficits and behaviors that affect mobility  - Identify mobility aids required to assist with transfers and/or ambulation (gait belt, sit-to-stand, lift, walker, cane, etc )  - [place] fall precautions as indicated by assessment  - Record patient progress and toleration of activity level on Mobility SBAR; progress patient to next Phase/Stage  - Instruct patient to call for assistance with activity based on assessment  - Consider rehabilitation consult to assist with strengthening/weightbearing, etc   Outcome: Progressing     Problem: DISCHARGE PLANNING  Goal: Discharge to home or other facility with appropriate resources  Description: INTERVENTIONS:  - Identify barriers to discharge w/patient and caregiver  - Arrange for needed discharge resources and transportation as appropriate  - Identify discharge learning needs (meds, wound care, etc )  - Arrange for interpretive services to assist at discharge as needed  - Refer to Case Management Department for coordinating discharge planning if the patient needs post-hospital services based on physician/advanced practitioner order or complex needs related to functional status, cognitive ability, or social support system  Outcome: Progressing     Problem: Knowledge Deficit  Goal: Patient/family/caregiver demonstrates understanding of disease process, treatment plan, medications, and discharge instructions  Description: Complete learning assessment and assess knowledge base    Interventions:  - Provide teaching at level of understanding  - Provide teaching via preferred learning methods  Outcome: Progressing     Problem: NEUROSENSORY - ADULT  Goal: Achieves stable or improved neurological status  Description: INTERVENTIONS  - Monitor and report changes in neurological status  - Monitor vital signs such as temperature, blood pressure, glucose, and any other labs ordered   - Initiate measures to prevent increased intracranial pressure  - Monitor for seizure activity and implement precautions if appropriate      Outcome: Progressing  Goal: Remains free of injury related to seizures activity  Description: INTERVENTIONS  - Maintain airway, patient safety  and administer oxygen as ordered  - Monitor patient for seizure activity, document and report duration and description of seizure to physician/advanced practitioner  - If seizure occurs,  ensure patient safety during seizure  - Reorient patient post seizure  - Seizure pads on all 4 side rails  - Instruct patient/family to notify RN of any seizure activity including if an aura is experienced  - Instruct patient/family to call for assistance with activity based on nursing assessment  - Administer anti-seizure medications if ordered    Outcome: Progressing  Goal: Achieves maximal functionality and self care  Description: INTERVENTIONS  - Monitor swallowing and airway patency with patient fatigue and changes in neurological status  - Encourage and assist patient to increase activity and self care     - Encourage visually impaired, hearing impaired and aphasic patients to use assistive/communication devices  Outcome: Progressing     Problem: CARDIOVASCULAR - ADULT  Goal: Maintains optimal cardiac output and hemodynamic stability  Description: INTERVENTIONS:  - Monitor I/O, vital signs and rhythm  - Monitor for S/S and trends of decreased cardiac output  - Administer and titrate ordered vasoactive medications to optimize hemodynamic stability  - Assess quality of pulses, skin color and temperature  - Assess for signs of decreased coronary artery perfusion  - Instruct patient to report change in severity of symptoms  Outcome: Progressing  Goal: Absence of cardiac dysrhythmias or at baseline rhythm  Description: INTERVENTIONS:  - Continuous cardiac monitoring, vital signs, obtain 12 lead EKG if ordered  - Administer antiarrhythmic and heart rate control medications as ordered  - Monitor electrolytes and administer replacement therapy as ordered  Outcome: Progressing     Problem: RESPIRATORY - ADULT  Goal: Achieves optimal ventilation and oxygenation  Description: INTERVENTIONS:  - Assess for changes in respiratory status  - Assess for changes in mentation and behavior  - Position to facilitate oxygenation and minimize respiratory effort  - Oxygen administered by appropriate delivery if ordered  - Initiate smoking cessation education as indicated  - Encourage broncho-pulmonary hygiene including cough, deep breathe, Incentive Spirometry  - Assess the need for suctioning and aspirate as needed  - Assess and instruct to report SOB or any respiratory difficulty  - Respiratory Therapy support as indicated  Outcome: Progressing     Problem: GASTROINTESTINAL - ADULT  Goal: Minimal or absence of nausea and/or vomiting  Description: INTERVENTIONS:  - Administer IV fluids if ordered to ensure adequate hydration  - Maintain NPO status until nausea and vomiting are resolved  - Nasogastric tube if ordered  - Administer ordered antiemetic medications as needed  - Provide nonpharmacologic comfort measures as appropriate  - Advance diet as tolerated, if ordered  - Consider nutrition services referral to assist patient with adequate nutrition and appropriate food choices  Outcome: Progressing  Goal: Maintains or returns to baseline bowel function  Description: INTERVENTIONS:  - Assess bowel function  - Encourage oral fluids to ensure adequate hydration  - Administer IV fluids if ordered to ensure adequate hydration  - Administer ordered medications as needed  - Encourage mobilization and activity  - Consider nutritional services referral to assist patient with adequate nutrition and appropriate food choices  Outcome: Progressing  Goal: Maintains adequate nutritional intake  Description: INTERVENTIONS:  - Monitor percentage of each meal consumed  - Identify factors contributing to decreased intake, treat as appropriate  - Assist with meals as needed  - Monitor I&O, weight, and lab values if indicated  - Obtain nutrition services referral as needed  Outcome: Progressing     Problem: GENITOURINARY - ADULT  Goal: Maintains or returns to baseline urinary function  Description: INTERVENTIONS:  - Assess urinary function  - Encourage oral fluids to ensure adequate hydration if ordered  - Administer IV fluids as ordered to ensure adequate hydration  - Administer ordered medications as needed  - Offer frequent toileting  - Follow urinary retention protocol if ordered  Outcome: Progressing  Goal: Absence of urinary retention  Description: INTERVENTIONS:  - Assess patients ability to void and empty bladder  - Monitor I/O  - Bladder scan as needed  - Discuss with physician/AP medications to alleviate retention as needed  - Discuss catheterization for long term situations as appropriate  Outcome: Progressing     Problem: METABOLIC, FLUID AND ELECTROLYTES - ADULT  Goal: Electrolytes maintained within normal limits  Description: INTERVENTIONS:  - Monitor labs and assess patient for signs and symptoms of electrolyte imbalances  - Administer electrolyte replacement as ordered  - Monitor response to electrolyte replacements, including repeat lab results as appropriate  - Instruct patient on fluid and nutrition as appropriate  Outcome: Progressing  Goal: Fluid balance maintained  Description: INTERVENTIONS:  - Monitor labs   - Monitor I/O and WT  - Instruct patient on fluid and nutrition as appropriate  - Assess for signs & symptoms of volume excess or deficit  Outcome: Progressing  Goal: Glucose maintained within target range  Description: INTERVENTIONS:  - Monitor Blood Glucose as ordered  - Assess for signs and symptoms of hyperglycemia and hypoglycemia  - Administer ordered medications to maintain glucose within target range  - Assess nutritional intake and initiate nutrition service referral as needed  Outcome: Progressing     Problem: SKIN/TISSUE INTEGRITY - ADULT  Goal: Skin integrity remains intact  Description: INTERVENTIONS  - Identify patients at risk for skin breakdown  - Assess and monitor skin integrity  - Assess and monitor nutrition and hydration status  - Monitor labs (i e  albumin)  - Assess for incontinence   - Turn and reposition patient  - Assist with mobility/ambulation  - Relieve pressure over bony prominences  - Avoid friction and shearing  - Provide appropriate hygiene as needed including keeping skin clean and dry  - Evaluate need for skin moisturizer/barrier cream  - Collaborate with interdisciplinary team (i e  Nutrition, Rehabilitation, etc )   - Patient/family teaching  Outcome: Progressing  Goal: Incision(s), wounds(s) or drain site(s) healing without S/S of infection  Description: INTERVENTIONS  - Assess and document risk factors for skin impairment   - Assess and document dressing, incision, wound bed, drain sites and surrounding tissue  - Consider nutrition services referral as needed  - Oral mucous membranes remain intact  - Provide patient/ family education  Outcome: Progressing  Goal: Oral mucous membranes remain intact  Description: INTERVENTIONS  - Assess oral mucosa and hygiene practices  - Implement preventative oral hygiene regimen  - Implement oral medicated treatments as ordered  - Initiate Nutrition services referral as needed  Outcome: Progressing     Problem: HEMATOLOGIC - ADULT  Goal: Maintains hematologic stability  Description: INTERVENTIONS  - Assess for signs and symptoms of bleeding or hemorrhage  - Monitor labs  - Administer supportive blood products/factors as ordered and appropriate  Outcome: Progressing     Problem: MUSCULOSKELETAL - ADULT  Goal: Maintain or return mobility to safest level of function  Description: INTERVENTIONS:  - Assess patient's ability to carry out ADLs; assess patient's baseline for ADL function and identify physical deficits which impact ability to perform ADLs (bathing, care of mouth/teeth, toileting, grooming, dressing, etc )  - Assess/evaluate cause of self-care deficits   - Assess range of motion  - Assess patient's mobility  - Assess patient's need for assistive devices and provide as appropriate  - Encourage maximum independence but intervene and supervise when necessary  - Involve family in performance of ADLs  - Assess for home care needs following discharge   - Consider OT consult to assist with ADL evaluation and planning for discharge  - Provide patient education as appropriate  Outcome: Progressing  Goal: Maintain proper alignment of affected body part  Description: INTERVENTIONS:  - Support, maintain and protect limb and body alignment  - Provide patient/ family with appropriate education  Outcome: Progressing

## 2021-02-10 NOTE — PLAN OF CARE
Problem: Potential for Falls  Goal: Patient will remain free of falls  Description: INTERVENTIONS:  - Assess patient frequently for physical needs  -  Identify cognitive and physical deficits and behaviors that affect risk of falls    -  [place] fall precautions as indicated by assessment   - Educate patient/family on patient safety including physical limitations  - Instruct patient to call for assistance with activity based on assessment  - Modify environment to reduce risk of injury  - Consider OT/PT consult to assist with strengthening/mobility  Outcome: Progressing     Problem: Prexisting or High Potential for Compromised Skin Integrity  Goal: Skin integrity is maintained or improved  Description: INTERVENTIONS:  - Identify patients at risk for skin breakdown  - Assess and monitor skin integrity  - Assess and monitor nutrition and hydration status  - Monitor labs   - Assess for incontinence   - Turn and reposition patient  - Assist with mobility/ambulation  - Relieve pressure over bony prominences  - Avoid friction and shearing  - Provide appropriate hygiene as needed including keeping skin clean and dry  - Evaluate need for skin moisturizer/barrier cream  - Collaborate with interdisciplinary team   - Patient/family teaching  - Consider wound care consult   Outcome: Progressing     Problem: PAIN - ADULT  Goal: Verbalizes/displays adequate comfort level or baseline comfort level  Description: Interventions:  - Encourage patient to monitor pain and request assistance  - Assess pain using appropriate pain scale  - Administer analgesics based on type and severity of pain and evaluate response  - Implement non-pharmacological measures as appropriate and evaluate response  - Consider cultural and social influences on pain and pain management  - Notify physician/advanced practitioner if interventions unsuccessful or patient reports new pain  Outcome: Progressing     Problem: INFECTION - ADULT  Goal: Absence or prevention of progression during hospitalization  Description: INTERVENTIONS:  - Assess and monitor for signs and symptoms of infection  - Monitor lab/diagnostic results  - Monitor all insertion sites, i e  indwelling lines, tubes, and drains  - Monitor endotracheal if appropriate and nasal secretions for changes in amount and color  - [place] appropriate cooling/warming therapies per order  - Administer medications as ordered  - Instruct and encourage patient and family to use good hand hygiene technique  - Identify and instruct in appropriate isolation precautions for identified infection/condition  Outcome: Progressing  Goal: Absence of fever/infection during neutropenic period  Description: INTERVENTIONS:  - Monitor WBC    Outcome: Progressing     Problem: SAFETY ADULT  Goal: Patient will remain free of falls  Description: INTERVENTIONS:  - Assess patient frequently for physical needs  -  Identify cognitive and physical deficits and behaviors that affect risk of falls    -  [place] fall precautions as indicated by assessment   - Educate patient/family on patient safety including physical limitations  - Instruct patient to call for assistance with activity based on assessment  - Modify environment to reduce risk of injury  - Consider OT/PT consult to assist with strengthening/mobility  Outcome: Progressing  Goal: Maintain or return to baseline ADL function  Description: INTERVENTIONS:  -  Assess patient's ability to carry out ADLs; assess patient's baseline for ADL function and identify physical deficits which impact ability to perform ADLs (bathing, care of mouth/teeth, toileting, grooming, dressing, etc )  - Assess/evaluate cause of self-care deficits   - Assess range of motion  - Assess patient's mobility; develop plan if impaired  - Assess patient's need for assistive devices and provide as appropriate  - Encourage maximum independence but intervene and supervise when necessary  - Involve family in performance of ADLs  - Assess for home care needs following discharge   - Consider OT consult to assist with ADL evaluation and planning for discharge  - Provide patient education as appropriate  Outcome: Progressing  Goal: Maintain or return mobility status to optimal level  Description: INTERVENTIONS:  - Assess patient's baseline mobility status (ambulation, transfers, stairs, etc )    - Identify cognitive and physical deficits and behaviors that affect mobility  - Identify mobility aids required to assist with transfers and/or ambulation (gait belt, sit-to-stand, lift, walker, cane, etc )  - [place] fall precautions as indicated by assessment  - Record patient progress and toleration of activity level on Mobility SBAR; progress patient to next Phase/Stage  - Instruct patient to call for assistance with activity based on assessment  - Consider rehabilitation consult to assist with strengthening/weightbearing, etc   Outcome: Progressing     Problem: DISCHARGE PLANNING  Goal: Discharge to home or other facility with appropriate resources  Description: INTERVENTIONS:  - Identify barriers to discharge w/patient and caregiver  - Arrange for needed discharge resources and transportation as appropriate  - Identify discharge learning needs (meds, wound care, etc )  - Arrange for interpretive services to assist at discharge as needed  - Refer to Case Management Department for coordinating discharge planning if the patient needs post-hospital services based on physician/advanced practitioner order or complex needs related to functional status, cognitive ability, or social support system  Outcome: Progressing     Problem: Knowledge Deficit  Goal: Patient/family/caregiver demonstrates understanding of disease process, treatment plan, medications, and discharge instructions  Description: Complete learning assessment and assess knowledge base    Interventions:  - Provide teaching at level of understanding  - Provide teaching via preferred learning methods  Outcome: Progressing     Problem: NEUROSENSORY - ADULT  Goal: Achieves stable or improved neurological status  Description: INTERVENTIONS  - Monitor and report changes in neurological status  - Monitor vital signs such as temperature, blood pressure, glucose, and any other labs ordered   - Initiate measures to prevent increased intracranial pressure  - Monitor for seizure activity and implement precautions if appropriate      Outcome: Progressing  Goal: Remains free of injury related to seizures activity  Description: INTERVENTIONS  - Maintain airway, patient safety  and administer oxygen as ordered  - Monitor patient for seizure activity, document and report duration and description of seizure to physician/advanced practitioner  - If seizure occurs,  ensure patient safety during seizure  - Reorient patient post seizure  - Seizure pads on all 4 side rails  - Instruct patient/family to notify RN of any seizure activity including if an aura is experienced  - Instruct patient/family to call for assistance with activity based on nursing assessment  - Administer anti-seizure medications if ordered    Outcome: Progressing  Goal: Achieves maximal functionality and self care  Description: INTERVENTIONS  - Monitor swallowing and airway patency with patient fatigue and changes in neurological status  - Encourage and assist patient to increase activity and self care     - Encourage visually impaired, hearing impaired and aphasic patients to use assistive/communication devices  Outcome: Progressing     Problem: CARDIOVASCULAR - ADULT  Goal: Maintains optimal cardiac output and hemodynamic stability  Description: INTERVENTIONS:  - Monitor I/O, vital signs and rhythm  - Monitor for S/S and trends of decreased cardiac output  - Administer and titrate ordered vasoactive medications to optimize hemodynamic stability  - Assess quality of pulses, skin color and temperature  - Assess for signs of decreased coronary artery perfusion  - Instruct patient to report change in severity of symptoms  Outcome: Progressing  Goal: Absence of cardiac dysrhythmias or at baseline rhythm  Description: INTERVENTIONS:  - Continuous cardiac monitoring, vital signs, obtain 12 lead EKG if ordered  - Administer antiarrhythmic and heart rate control medications as ordered  - Monitor electrolytes and administer replacement therapy as ordered  Outcome: Progressing     Problem: RESPIRATORY - ADULT  Goal: Achieves optimal ventilation and oxygenation  Description: INTERVENTIONS:  - Assess for changes in respiratory status  - Assess for changes in mentation and behavior  - Position to facilitate oxygenation and minimize respiratory effort  - Oxygen administered by appropriate delivery if ordered  - Initiate smoking cessation education as indicated  - Encourage broncho-pulmonary hygiene including cough, deep breathe, Incentive Spirometry  - Assess the need for suctioning and aspirate as needed  - Assess and instruct to report SOB or any respiratory difficulty  - Respiratory Therapy support as indicated  Outcome: Progressing     Problem: GASTROINTESTINAL - ADULT  Goal: Minimal or absence of nausea and/or vomiting  Description: INTERVENTIONS:  - Administer IV fluids if ordered to ensure adequate hydration  - Maintain NPO status until nausea and vomiting are resolved  - Nasogastric tube if ordered  - Administer ordered antiemetic medications as needed  - Provide nonpharmacologic comfort measures as appropriate  - Advance diet as tolerated, if ordered  - Consider nutrition services referral to assist patient with adequate nutrition and appropriate food choices  Outcome: Progressing  Goal: Maintains or returns to baseline bowel function  Description: INTERVENTIONS:  - Assess bowel function  - Encourage oral fluids to ensure adequate hydration  - Administer IV fluids if ordered to ensure adequate hydration  - Administer ordered medications as needed  - Encourage mobilization and activity  - Consider nutritional services referral to assist patient with adequate nutrition and appropriate food choices  Outcome: Progressing  Goal: Maintains adequate nutritional intake  Description: INTERVENTIONS:  - Monitor percentage of each meal consumed  - Identify factors contributing to decreased intake, treat as appropriate  - Assist with meals as needed  - Monitor I&O, weight, and lab values if indicated  - Obtain nutrition services referral as needed  Outcome: Progressing     Problem: GENITOURINARY - ADULT  Goal: Maintains or returns to baseline urinary function  Description: INTERVENTIONS:  - Assess urinary function  - Encourage oral fluids to ensure adequate hydration if ordered  - Administer IV fluids as ordered to ensure adequate hydration  - Administer ordered medications as needed  - Offer frequent toileting  - Follow urinary retention protocol if ordered  Outcome: Progressing  Goal: Absence of urinary retention  Description: INTERVENTIONS:  - Assess patients ability to void and empty bladder  - Monitor I/O  - Bladder scan as needed  - Discuss with physician/AP medications to alleviate retention as needed  - Discuss catheterization for long term situations as appropriate  Outcome: Progressing     Problem: METABOLIC, FLUID AND ELECTROLYTES - ADULT  Goal: Electrolytes maintained within normal limits  Description: INTERVENTIONS:  - Monitor labs and assess patient for signs and symptoms of electrolyte imbalances  - Administer electrolyte replacement as ordered  - Monitor response to electrolyte replacements, including repeat lab results as appropriate  - Instruct patient on fluid and nutrition as appropriate  Outcome: Progressing  Goal: Fluid balance maintained  Description: INTERVENTIONS:  - Monitor labs   - Monitor I/O and WT  - Instruct patient on fluid and nutrition as appropriate  - Assess for signs & symptoms of volume excess or deficit  Outcome: Progressing  Goal: Glucose maintained within target range  Description: INTERVENTIONS:  - Monitor Blood Glucose as ordered  - Assess for signs and symptoms of hyperglycemia and hypoglycemia  - Administer ordered medications to maintain glucose within target range  - Assess nutritional intake and initiate nutrition service referral as needed  Outcome: Progressing     Problem: SKIN/TISSUE INTEGRITY - ADULT  Goal: Skin integrity remains intact  Description: INTERVENTIONS  - Identify patients at risk for skin breakdown  - Assess and monitor skin integrity  - Assess and monitor nutrition and hydration status  - Monitor labs (i e  albumin)  - Assess for incontinence   - Turn and reposition patient  - Assist with mobility/ambulation  - Relieve pressure over bony prominences  - Avoid friction and shearing  - Provide appropriate hygiene as needed including keeping skin clean and dry  - Evaluate need for skin moisturizer/barrier cream  - Collaborate with interdisciplinary team (i e  Nutrition, Rehabilitation, etc )   - Patient/family teaching  Outcome: Progressing  Goal: Incision(s), wounds(s) or drain site(s) healing without S/S of infection  Description: INTERVENTIONS  - Assess and document risk factors for skin impairment   - Assess and document dressing, incision, wound bed, drain sites and surrounding tissue  - Consider nutrition services referral as needed  - Oral mucous membranes remain intact  - Provide patient/ family education  Outcome: Progressing  Goal: Oral mucous membranes remain intact  Description: INTERVENTIONS  - Assess oral mucosa and hygiene practices  - Implement preventative oral hygiene regimen  - Implement oral medicated treatments as ordered  - Initiate Nutrition services referral as needed  Outcome: Progressing     Problem: HEMATOLOGIC - ADULT  Goal: Maintains hematologic stability  Description: INTERVENTIONS  - Assess for signs and symptoms of bleeding or hemorrhage  - Monitor labs  - Administer supportive blood products/factors as ordered and appropriate  Outcome: Progressing     Problem: MUSCULOSKELETAL - ADULT  Goal: Maintain or return mobility to safest level of function  Description: INTERVENTIONS:  - Assess patient's ability to carry out ADLs; assess patient's baseline for ADL function and identify physical deficits which impact ability to perform ADLs (bathing, care of mouth/teeth, toileting, grooming, dressing, etc )  - Assess/evaluate cause of self-care deficits   - Assess range of motion  - Assess patient's mobility  - Assess patient's need for assistive devices and provide as appropriate  - Encourage maximum independence but intervene and supervise when necessary  - Involve family in performance of ADLs  - Assess for home care needs following discharge   - Consider OT consult to assist with ADL evaluation and planning for discharge  - Provide patient education as appropriate  Outcome: Progressing  Goal: Maintain proper alignment of affected body part  Description: INTERVENTIONS:  - Support, maintain and protect limb and body alignment  - Provide patient/ family with appropriate education  Outcome: Progressing

## 2021-02-10 NOTE — CASE MANAGEMENT
Pt initially admitted as observation with notification reviewed with registration thereafter being changed to inpatient due to uncontrolled HTN with difficulty regulating same  Pt also s/p fall with fibula fx now NWB in a camboot  Pt will need RW/BSC on d/c and is MA pending- search [place] performed with pt being 100% eligible for financial assistance  Will obtain DME prior to d/c and have medications obtained  Pt lives with girlfriend in a 2 story type apartment where bathroom only on 2nd floor- therapy to work on steps with pt however will have BSC to use on 1st floor  Pt was independent with all aspects of care, ambulating without an AD, driving self to work where he works [employer] for a [employer]  Pt to speak to his employer should FMLA paperwork be needed (will have faxed to this CM if needed)- understanding a work excuse will have DOS of hospital however release to work will be given by orthopedic who he will need to follow up with upon d/c- verbal understanding and agreement to same (CM card provided to pt so to assist with above task)  Denies MH hx, nor having D&A issues albeit was under the influence when fall occurred per documentation  Pt has no PCP and agreeable to seeing Enrique Perez upon d/c- verbal understanding of the importance of being compliant with follow up appointments and medications- states his vision was abnormal upon admission and wasn't aware it was likely due to his blood pressure- states he was on meds before but felt better so he stopped them    understands now he felt better because he WAS on the meds  Will alert Enrique Perez to the need for an appointment as well as information being placed on AVS   Pt typically uses [facility] in [address] however will have meds to bed on d/c with financial assistance for same- DC Milestone added to EHR  Pt's girlfriend will transport home on d/c  No further questions/concerns voiced at present  Will continue to follow to assist with dc poc

## 2021-02-10 NOTE — CONSULTS
Consultation - Nephrology   Deep Stanley 53 y o  male MRN: [id number]  [facility] [address] [id number] Encounter: [id number]    ASSESSMENT and PLAN:   Acute kidney injury (POA) versus suspected underlying chronic kidney disease:  - etiology suspect secondary to prerenal component with hypovolemia, uncontrolled hypertension, NSAID nephropathy  - unknown baseline creatinine   - creatinine 1 72 mg/dL upon admission on 02/07   - most recent creatinine 1 50 mg/dL today  - discontinue HCTZ  - lisinopril discontinued, will restart lisinopril 20 mg daily   - UA completed on 02/08 revealed urine specific gravity > 1 030, no protein, no blood, 0-1 WBC, occasional bacteria  - will check PVR with bladder scan, maintain urinary retention protocol   - will check renal ultrasound  - avoid NSAIDs, nephrotoxic agents, IV contrast   - adjust medications to appropriate GFR  - monitor volume status with strict intake/output, daily weight  - will check BMP, magnesium, phosphorus in a m  Electrolytes, acid/base:  - hyponatremia with most recent sodium 131 today  - discontinue HCTZ as above  - if sodium continues to trend downward, will complete secondary workup  - will continue to monitor with repeat lab studies  Blood pressure, hypertensive urgency:  - systolic blood pressure > 240 upon admission, improving today  - outpatient regimen includes: Hydrochlorothiazide 25 mg daily  However, patient states he had not taken his blood pressure medications in over year  - currently on: Amlodipine 10 mg daily, hydrochlorothiazide 12 5 mg daily, lisinopril 5 mg daily  - recommendations:  Discontinue hydrochlorothiazide, lisinopril discontinued  Will restart lisinopril 20 mg daily + carvedilol 12 5 mg b i d  Will check plasma metanephrines, renin, aldosterone  - optimize hemodynamics; avoid hypotension and fluctuations of blood pressure   - maintain MAP > 65       H/H, anemia:  - most recent hemoglobin 10 5 grams/deciliter   - goal hemoglobin greater than 8 grams/deciliter  - recommend PRBC transfusion for hemoglobin less than 7    - will check iron panel  Other medical problems:  - fracture of right fibula, status post mechanical fall on black ice:   - non-weight-bearing on right lower extremity  - plan per Orthopedics  HISTORY OF PRESENT ILLNESS:  Requesting Physician: Chicho Powell MD  Reason for Consult:  Acute kidney injury versus CKD, hypertensive urgency    Shweta Palmer is a 53 y o  male with history of hypertension who was admitted to [facility] after presenting with mechanical fall on black ice  Patient was walking outside and slipped on black ice and immediately noticed swelling in his right lower extremity her to correct  Patient was found to have a fibula fracture  Subsequently, patient's systolic blood pressure was greater than 240 upon admission  Upon assessment and evaluation, patient is resting in bed comfortably  Patient is without acute shortness of breath or chest pain  Patient states he has not taking his blood pressure medication in over 1 year  Further, patient states he has had hypertension for over 3 years  Patient has taken Advil 1-2 daily for approximately 2 months  Per patient, he did drink alcohol on Sunday, however does not frequently consume alcohol  He denies difficulty voiding  A renal consultation is requested today for assistance in the management of Acute kidney injury versus CKD, hypertensive urgency      PAST MEDICAL HISTORY:  Past Medical History:   Diagnosis Date    Hypertension        PAST SURGICAL HISTORY:  Past Surgical History:   Procedure Laterality Date    MANDIBLE FRACTURE SURGERY         ALLERGIES:  No Known Allergies    SOCIAL HISTORY:  Social History     Substance and Sexual Activity   Alcohol Use Not Currently    Comment: Socially     Social History     Substance and Sexual Activity   Drug Use Yes    Types: Marijuana    Comment: THC occasionally     Social History     Tobacco Use   Smoking Status Never Smoker   Smokeless Tobacco Never Used       FAMILY HISTORY:  History reviewed  No pertinent family history  MEDICATIONS:    Current Facility-Administered Medications:     acetaminophen (TYLENOL) tablet 975 mg, 975 mg, Oral, Q8H [facility], Augustina Basset, DO, 975 mg at 02/10/21 0513    amLODIPine (NORVASC) tablet 10 mg, 10 mg, Oral, Daily, MUSA Parker, 10 mg at 02/10/21 7638    docusate sodium (COLACE) capsule 100 mg, 100 mg, Oral, BID, Augustina Basset, DO, 100 mg at 02/10/21 6567    heparin (porcine) subcutaneous injection 5,000 Units, 5,000 Units, Subcutaneous, Q8H [facility], Augustina Basset, DO, 5,000 Units at 02/10/21 0513    hydrALAZINE (APRESOLINE) injection 10 mg, 10 mg, Intravenous, Q6H PRN, Prem Burroughs PA-C, 10 mg at 02/09/21 1656    hydrochlorothiazide (HYDRODIURIL) tablet 12 5 mg, 12 5 mg, Oral, Daily, Anais Raphael PA-C, 12 5 mg at 02/10/21 5846    HYDROmorphone (DILAUDID) injection 0 5 mg, 0 5 mg, Intravenous, Q1H PRN, Augustina Basset, DO, 0 5 mg at 02/09/21 2018    hydrOXYzine HCL (ATARAX) tablet 25 mg, 25 mg, Oral, Q6H PRN, Augustina Basset, DO    oxyCODONE (ROXICODONE) immediate release tablet 10 mg, 10 mg, Oral, Q4H PRN, Augustina Basset, DO, 10 mg at 02/09/21 1834    oxyCODONE (ROXICODONE) IR tablet 5 mg, 5 mg, Oral, Q4H PRN, Augustina Basset, DO    REVIEW OF SYSTEMS:  All the systems were reviewed and were negative except as documented on the HPI      PHYSICAL EXAM:  Current Weight:    First Weight:    Vitals:    02/09/21 2006 02/10/21 0311 02/10/21 0445 02/10/21 0725   BP: (!) 166/107 (!) 160/102 153/99 138/100   BP Location:    Left arm   Pulse: 78 79 75 71   Resp: 20   16   Temp: (!) 97 1 °F (36 2 °C)   98 4 °F (36 9 °C)   TempSrc:       SpO2: 99% 96% 91% 100%       Intake/Output Summary (Last 24 hours) at 2/10/2021 0936  Last data filed at 2/10/2021 0901  Gross per 24 hour   Intake 600 ml   Output 3525 ml   Net -2925 ml     General: conscious, coherent, cooperative, not in acute distress  Skin: no rash, warm  Eyes: pale conjunctivae, anicteric sclerae  ENT: moist lips and mucous membranes  Neck: supple, with trachea midline  Chest: clear breath sounds bilaterally  CVS: distinct S1 & S2, normal rate, regular rhythm  Abdomen: soft, non-tender, non-distended, normoactive bowel sounds  Extremities:  Minimal edema on the right, in brace  Neuro: awake, alert  Psych: appropriate affect       Invasive Devices:        Lab Results:   Results from last 7 days   Lab Units 02/10/21  0457 02/09/21  0506 02/08/21  0548 02/07/21  1954   WBC Thousand/uL  --   --  5 83 6 15   HEMOGLOBIN g/dL  --   --  10 5* 11 5*   HEMATOCRIT %  --   --  33 0* 36 9   PLATELETS Thousands/uL  --   --  223 262   POTASSIUM mmol/L 4 0 4 3 3 9 3 8   CHLORIDE mmol/L 96* 103 105 104   CO2 mmol/L 26 29 24 28   BUN mg/dL 17 12 15 17   CREATININE mg/dL 1 50* 1 43* 1 38* 1 72*   CALCIUM mg/dL 9 4 8 6 8 4 9 0   ALK PHOS U/L  --   --   --  71   ALT U/L  --   --   --  24   AST U/L  --   --   --  22

## 2021-02-11 VITALS
RESPIRATION RATE: 18 BRPM | WEIGHT: 191.14 LBS | HEART RATE: 74 BPM | DIASTOLIC BLOOD PRESSURE: 96 MMHG | OXYGEN SATURATION: 98 % | BODY MASS INDEX: 26.66 KG/M2 | SYSTOLIC BLOOD PRESSURE: 147 MMHG | TEMPERATURE: 98.3 F

## 2021-02-11 LAB
ANION GAP SERPL CALCULATED.3IONS-SCNC: 10 MMOL/L (ref 4–13)
BUN SERPL-MCNC: 30 MG/DL (ref 5–25)
CALCIUM SERPL-MCNC: 9.6 MG/DL (ref 8.3–10.1)
CHLORIDE SERPL-SCNC: 97 MMOL/L (ref 100–108)
CO2 SERPL-SCNC: 25 MMOL/L (ref 21–32)
CREAT SERPL-MCNC: 1.76 MG/DL (ref 0.6–1.3)
ERYTHROCYTE [DISTWIDTH] IN BLOOD BY AUTOMATED COUNT: 13.2 % (ref 11.6–15.1)
FERRITIN SERPL-MCNC: 259 NG/ML (ref 8–388)
GFR SERPL CREATININE-BSD FRML MDRD: 51 ML/MIN/1.73SQ M
GLUCOSE SERPL-MCNC: 93 MG/DL (ref 65–140)
HCT VFR BLD AUTO: 39.9 % (ref 36.5–49.3)
HGB BLD-MCNC: 12.3 G/DL (ref 12–17)
IRON SATN MFR SERPL: 29 %
IRON SERPL-MCNC: 86 UG/DL (ref 65–175)
MAGNESIUM SERPL-MCNC: 2.3 MG/DL (ref 1.6–2.6)
MCH RBC QN AUTO: 26.4 PG (ref 26.8–34.3)
MCHC RBC AUTO-ENTMCNC: 30.8 G/DL (ref 31.4–37.4)
MCV RBC AUTO: 86 FL (ref 82–98)
PHOSPHATE SERPL-MCNC: 2.9 MG/DL (ref 2.7–4.5)
PLATELET # BLD AUTO: 361 THOUSANDS/UL (ref 149–390)
PMV BLD AUTO: 10.9 FL (ref 8.9–12.7)
POTASSIUM SERPL-SCNC: 4.3 MMOL/L (ref 3.5–5.3)
RBC # BLD AUTO: 4.66 MILLION/UL (ref 3.88–5.62)
SODIUM SERPL-SCNC: 132 MMOL/L (ref 136–145)
TIBC SERPL-MCNC: 297 UG/DL (ref 250–450)
WBC # BLD AUTO: 5.18 THOUSAND/UL (ref 4.31–10.16)

## 2021-02-11 PROCEDURE — 85027 COMPLETE CBC AUTOMATED: CPT | Performed by: PHYSICIAN ASSISTANT

## 2021-02-11 PROCEDURE — 84100 ASSAY OF PHOSPHORUS: CPT | Performed by: NURSE PRACTITIONER

## 2021-02-11 PROCEDURE — 83550 IRON BINDING TEST: CPT | Performed by: NURSE PRACTITIONER

## 2021-02-11 PROCEDURE — 82728 ASSAY OF FERRITIN: CPT | Performed by: NURSE PRACTITIONER

## 2021-02-11 PROCEDURE — 97530 THERAPEUTIC ACTIVITIES: CPT

## 2021-02-11 PROCEDURE — 82384 ASSAY THREE CATECHOLAMINES: CPT | Performed by: NURSE PRACTITIONER

## 2021-02-11 PROCEDURE — 99239 HOSP IP/OBS DSCHRG MGMT >30: CPT | Performed by: INTERNAL MEDICINE

## 2021-02-11 PROCEDURE — 99232 SBSQ HOSP IP/OBS MODERATE 35: CPT | Performed by: INTERNAL MEDICINE

## 2021-02-11 PROCEDURE — 83540 ASSAY OF IRON: CPT | Performed by: NURSE PRACTITIONER

## 2021-02-11 PROCEDURE — 80048 BASIC METABOLIC PNL TOTAL CA: CPT | Performed by: NURSE PRACTITIONER

## 2021-02-11 PROCEDURE — 83735 ASSAY OF MAGNESIUM: CPT | Performed by: NURSE PRACTITIONER

## 2021-02-11 PROCEDURE — 84244 ASSAY OF RENIN: CPT | Performed by: NURSE PRACTITIONER

## 2021-02-11 PROCEDURE — 97116 GAIT TRAINING THERAPY: CPT

## 2021-02-11 PROCEDURE — 83835 ASSAY OF METANEPHRINES: CPT | Performed by: NURSE PRACTITIONER

## 2021-02-11 PROCEDURE — 82088 ASSAY OF ALDOSTERONE: CPT | Performed by: NURSE PRACTITIONER

## 2021-02-11 RX ORDER — AMLODIPINE BESYLATE 10 MG/1
10 TABLET ORAL DAILY
Qty: 30 TABLET | Refills: 0 | Status: SHIPPED | OUTPATIENT
Start: 2021-02-12 | End: 2021-03-02 | Stop reason: SDUPTHER

## 2021-02-11 RX ORDER — LISINOPRIL 20 MG/1
20 TABLET ORAL DAILY
Qty: 20 TABLET | Refills: 0 | Status: SHIPPED | OUTPATIENT
Start: 2021-02-12 | End: 2021-03-02 | Stop reason: SINTOL

## 2021-02-11 RX ORDER — OXYCODONE HYDROCHLORIDE 5 MG/1
5 TABLET ORAL EVERY 4 HOURS PRN
Qty: 4 TABLET | Refills: 0 | Status: SHIPPED | OUTPATIENT
Start: 2021-02-11 | End: 2021-02-17

## 2021-02-11 RX ORDER — CARVEDILOL 12.5 MG/1
12.5 TABLET ORAL 2 TIMES DAILY WITH MEALS
Qty: 30 TABLET | Refills: 0 | Status: SHIPPED | OUTPATIENT
Start: 2021-02-11 | End: 2021-03-01 | Stop reason: SDUPTHER

## 2021-02-11 RX ADMIN — AMLODIPINE BESYLATE 10 MG: 10 TABLET ORAL at 08:00

## 2021-02-11 RX ADMIN — ACETAMINOPHEN 975 MG: 325 TABLET ORAL at 05:40

## 2021-02-11 RX ADMIN — ACETAMINOPHEN 975 MG: 325 TABLET ORAL at 13:22

## 2021-02-11 RX ADMIN — HEPARIN SODIUM 5000 UNITS: 5000 INJECTION INTRAVENOUS; SUBCUTANEOUS at 05:40

## 2021-02-11 RX ADMIN — LISINOPRIL 20 MG: 20 TABLET ORAL at 08:00

## 2021-02-11 RX ADMIN — DOCUSATE SODIUM 100 MG: 100 CAPSULE, LIQUID FILLED ORAL at 08:00

## 2021-02-11 RX ADMIN — CARVEDILOL 12.5 MG: 6.25 TABLET, FILM COATED ORAL at 08:00

## 2021-02-11 RX ADMIN — HYDROMORPHONE HYDROCHLORIDE 0.5 MG: 1 INJECTION, SOLUTION INTRAMUSCULAR; INTRAVENOUS; SUBCUTANEOUS at 01:18

## 2021-02-11 NOTE — ASSESSMENT & PLAN NOTE
Mechanical fall on black ice with Right fibula fracture  Orthopedics consultation appreciated   Conservative treatment   Cam walker boot   PT consulted - return to home with social support   Non-weightbearing RLE with crutches or walker   Pain control   Follow up with Dr Mode Frost in 1 week for repeat xrays

## 2021-02-11 NOTE — PROGRESS NOTES
Follow up Consultation    Nephrology   Rocky Ramirez 53 y o  male MRN: [id number]  Unit/Bed#: [address] 2 [address] [id number] Encounter: [id number]      Physician Requesting Consult: Ladarius Lieberman MD        ASSESSMENT/PLAN:  78-year-old male past medical history of hypertension admitted status post fall and found to have right fibula fracture  Nephrology consulted for evaluation management of hypertension       · Acute kidney injury (POA) versus CKD:  - no prior baseline records available  - he likely has underlying CKD due to uncontrolled hypertension/hypertensive nephrosclerosis and NSAID nephropathy  - however is at risk for acute kidney injury due to failure to auto regulate in presence of hemodynamic perturbations along with NSAID nephropathy  - patient was admitted with a creatinine of 1 72 mg/dL on 02/07/2021   - patient's creatinine today is at 1 76 mg/dL, slightly elevated from yesterday however could still be his baseline   -continue to hold HCTZ due to hyponatremia  - check BMP, magnesium, phosphorus in a m  If still in the hospital  - renal ultrasound from 02/10/2021 showed bilateral kidneys approximately 11 cm no hydronephrosis no masses  - Await renal recovery  - Clinically patient is not uremic and there is no acute indication for renal replacement therapy (dialysis)  - Optimize hemodynamic status to avoid delay in renal recovery  - Place on a renal diet when allowed diet order    - Avoid nephrotoxins, adjust meds to appropriate GFR   - Strict I/O   - Daily weights  - Urinary retention protocol if patient does not have a [place]  - will need to set up patient for follow up with Nephrology as an outpatient post hospitalization  - will need to be seen in the office on 02/16/2021 with Nephrology  - stable for discharge from renal standpoint when medically cleared  Have sent office a message to arrange for outpatient follow-up on 02/16/2021    He will get labs on 02/15/2021     · Hypertensive urgency  - home medications: HCTZ however he had stop that a while back due to ensure lack of insurance  - current medications:  Norvasc 10 mg p o  Q day Coreg 12 5 mg p o  B i d , lisinopril 20 mg p o  Q day  - recommendations:   no changes for now  - pending check plasma metanephrines, renin, aldosterone  - Optimize hemodynamics   - Maintain MAP > 65mmHg  - Avoid BP fluctuations      · H/H/anemia:  - most recent hemoglobin at 12 3 grams/deciliter  - maintain hemoglobin greater than 8 grams/deciliter     · Acid-base electrolytes:  ? Hyponatremia:    § Most recent sodium at 132 mEq, improving  § Likely secondary to HCTZ  continue to hold HCTZ      ?  Acid-base:    § Most recent bicarb at 25     · Volume status:  ?  Clinically appears to be euvolemic     · Proteinuria:   ? Most recent UA with no protein no blood as of 2021     · Other medical problems:  ? Right fibula fracture:  Management per primary team    Thanks for the consult  Will continue to follow  Please call with questions/ concerns  Above-mentioned orders and Plan in terms of blood pressures was discussed with the team in [address] MD, FASN, 2021, 11:08 AM              Objective :   Patient seen and examined in his room no overnight events blood pressure stable and improved remains afebrile urine output 1 9 L  PHYSICAL EXAM  /96   Pulse 74   Temp 98 3 °F (36 8 °C)   Resp 18   Wt 86 7 kg (191 lb 2 2 oz)   SpO2 98%   BMI 26 66 kg/m²   Temp (24hrs), Av 4 °F (36 9 °C), Min:98 3 °F (36 8 °C), Max:98 6 °F (37 °C)        Intake/Output Summary (Last 24 hours) at 2021 1108  Last data filed at 2021 1016  Gross per 24 hour   Intake 720 ml   Output 1250 ml   Net -530 ml       I/O last 24 hours: In: 1320 [P O :1320]  Out: 2325 [Urine:2325]      Current Weight: Weight - Scale: 86 7 kg (191 lb 2 2 oz)  First Weight: Weight - Scale: 86 7 kg (191 lb 2 2 oz)  Physical Exam  Vitals signs and nursing note reviewed  Constitutional:       General: He is not in acute distress  Appearance: Normal appearance  He is normal weight  He is not ill-appearing, toxic-appearing or diaphoretic  HENT:      Head: Normocephalic and atraumatic  Mouth/Throat:      Mouth: Mucous membranes are moist       Pharynx: Oropharynx is clear  No oropharyngeal exudate  Eyes:      General: No scleral icterus  Conjunctiva/sclera: Conjunctivae normal    Neck:      Musculoskeletal: Normal range of motion and neck supple  Cardiovascular:      Rate and Rhythm: Normal rate  Heart sounds: Normal heart sounds  No friction rub  Pulmonary:      Effort: Pulmonary effort is normal  No respiratory distress  Breath sounds: Normal breath sounds  No wheezing  Abdominal:      General: There is no distension  Palpations: Abdomen is soft  There is no mass  Tenderness: There is no abdominal tenderness  Musculoskeletal:         General: No swelling  Skin:     General: Skin is warm  Coloration: Skin is not jaundiced  Neurological:      General: No focal deficit present  Mental Status: He is alert and oriented to person, place, and time  Psychiatric:         Mood and Affect: Mood normal          Behavior: Behavior normal              Review of Systems   Constitutional: Negative for chills and fatigue  HENT: Negative for congestion  Respiratory: Negative for cough, shortness of breath and wheezing  Cardiovascular: Negative for leg swelling  Gastrointestinal: Negative for abdominal pain, constipation, diarrhea, nausea and vomiting  Genitourinary: Negative for difficulty urinating and dysuria  Musculoskeletal: Negative for back pain  Skin: Negative for rash  Neurological: Negative for dizziness and headaches  Psychiatric/Behavioral: Negative for confusion  All other systems reviewed and are negative        Scheduled Meds:  Current Facility-Administered Medications   Medication Dose Route Frequency Provider Last Rate    acetaminophen  975 mg Oral [facility] Jessica Spare, DO      amLODIPine  10 mg Oral Daily MUSA Parker      carvedilol  12 5 mg Oral BID With Meals MUSA Parker      docusate sodium  100 mg Oral BID Jessica Spare, DO      heparin (porcine)  5,000 Units Subcutaneous [facility] Jessica Spare, DO      hydrALAZINE  25 mg Oral Q8H PRN Kimberley Baum MD      HYDROmorphone  0 5 mg Intravenous Q1H PRN Jessica Spare, DO      hydrOXYzine HCL  25 mg Oral Q6H PRN Jessica Spare, DO      lisinopril  20 mg Oral Daily MUSA Parker      oxyCODONE  10 mg Oral Q4H PRN Jessica Spare, DO      oxyCODONE  5 mg Oral Q4H PRN Jessica Spare, DO         PRN Meds: hydrALAZINE    HYDROmorphone    hydrOXYzine HCL    oxyCODONE    oxyCODONE    Continuous Infusions:       Invasive Devices: Invasive Devices     Peripheral Intravenous Line            Peripheral IV 02/11/21 Distal;Right;Upper;Ventral (anterior) Arm less than 1 day                  LABORATORY:    Results from last 7 days   Lab Units 02/11/21  0623 02/11/21  0622 02/10/21  0457 02/09/21  0506 02/08/21  0548 02/07/21  1954   WBC [place]/uL  --  5 18  --   --  5 83 6 15   HEMOGLOBIN g/dL  --  12 3  --   --  10 5* 11 5*   HEMATOCRIT %  --  39 9  --   --  33 0* 36 9   PLATELETS Thousands/uL  --  361  --   --  223 262   POTASSIUM mmol/L 4 3  --  4 0 4 3 3 9 3 8   CHLORIDE mmol/L 97*  --  96* 103 105 104   CO2 mmol/L 25  --  26 29 24 28   BUN mg/dL 30*  --  17 12 15 17   CREATININE mg/dL 1 76*  --  1 50* 1 43* 1 38* 1 72*   CALCIUM mg/dL 9 6  --  9 4 8 6 8 4 9 0   MAGNESIUM mg/dL 2 3  --   --   --   --   --    PHOSPHORUS mg/dL 2 9  --   --   --   --   --       rest all reviewed    RADIOLOGY:  US kidney and bladder   Final Result by Fish Argueta MD (02/10 2059)      Unremarkable kidneys        Workstation performed: [id number]         XR chest 1 view portable   ED Interpretation by MUSA Elliott (02/07 2159)   Preliminary reading  Wide mediastinum with cardiomegaly  Waiting on rad read       Final Result by Cesia Velasquez MD (02/08 0197)      No acute cardiopulmonary disease  Cardiomegaly  Workstation performed: [id number]         XR ankle 3+ views RIGHT   ED Interpretation by MUSA Brandon (02/07 2030)   Preliminary reading  No acute process seen  Waiting on rad read       Final Result by Maverick Bernabe MD (02/08 7241)      1  Widening of the medial malleolus  In conjunction with proximal fibular fracture, findings are suggestive of [place] fracture/injury  2   Findings suspicious for nondisplaced posterior malleolar fracture  3   Diffuse soft tissue swelling about the ankle greater medially  The study was marked in [facility] for immediate notification  Workstation performed: [id number]         XR tibia fibula 2 views RIGHT   ED Interpretation by MUSA Brandon (02/07 2055)   Preliminary reading  + proximal fibula fracture shaft  Waiting on rad read       Final Result by Candida Akhtar MD (02/08 0913)      Mildly displaced proximal right fibular shaft fracture  Findings concur with the referring clinician's preliminary interpretation already in the patient's electronic health record  Workstation performed: [id number]           Rest all reviewed    Portions of the record may have been created with voice recognition software  Occasional wrong word or "sound a like" substitutions may have occurred due to the inherent limitations of voice recognition software  Read the chart carefully and recognize, using context, where substitutions have occurred  If you have any questions, please contact the dictating provider

## 2021-02-11 NOTE — ASSESSMENT & PLAN NOTE
Unknown baseline creatinine   suspect underlying CKD secondary to chronic hypertensive nephrosclerosis  POA Cr 1 7 --> 1 38 --> 1 43 --> 1 50-->1 76   discussed with Nephrology, outpatient monitoring with repeat BMP and nephrology follow-up

## 2021-02-11 NOTE — PLAN OF CARE
Problem: Potential for Falls  Goal: Patient will remain free of falls  Description: INTERVENTIONS:  - Assess patient frequently for physical needs  -  Identify cognitive and physical deficits and behaviors that affect risk of falls    -  [place] fall precautions as indicated by assessment   - Educate patient/family on patient safety including physical limitations  - Instruct patient to call for assistance with activity based on assessment  - Modify environment to reduce risk of injury  - Consider OT/PT consult to assist with strengthening/mobility  Outcome: Progressing     Problem: Prexisting or High Potential for Compromised Skin Integrity  Goal: Skin integrity is maintained or improved  Description: INTERVENTIONS:  - Identify patients at risk for skin breakdown  - Assess and monitor skin integrity  - Assess and monitor nutrition and hydration status  - Monitor labs   - Assess for incontinence   - Turn and reposition patient  - Assist with mobility/ambulation  - Relieve pressure over bony prominences  - Avoid friction and shearing  - Provide appropriate hygiene as needed including keeping skin clean and dry  - Evaluate need for skin moisturizer/barrier cream  - Collaborate with interdisciplinary team   - Patient/family teaching  - Consider wound care consult   Outcome: Progressing     Problem: PAIN - ADULT  Goal: Verbalizes/displays adequate comfort level or baseline comfort level  Description: Interventions:  - Encourage patient to monitor pain and request assistance  - Assess pain using appropriate pain scale  - Administer analgesics based on type and severity of pain and evaluate response  - Implement non-pharmacological measures as appropriate and evaluate response  - Consider cultural and social influences on pain and pain management  - Notify physician/advanced practitioner if interventions unsuccessful or patient reports new pain  Outcome: Progressing     Problem: INFECTION - ADULT  Goal: Absence or prevention of progression during hospitalization  Description: INTERVENTIONS:  - Assess and monitor for signs and symptoms of infection  - Monitor lab/diagnostic results  - Monitor all insertion sites, i e  indwelling lines, tubes, and drains  - Monitor endotracheal if appropriate and nasal secretions for changes in amount and color  - [place] appropriate cooling/warming therapies per order  - Administer medications as ordered  - Instruct and encourage patient and family to use good hand hygiene technique  - Identify and instruct in appropriate isolation precautions for identified infection/condition  Outcome: Progressing     Problem: SAFETY ADULT  Goal: Patient will remain free of falls  Description: INTERVENTIONS:  - Assess patient frequently for physical needs  -  Identify cognitive and physical deficits and behaviors that affect risk of falls    -  [place] fall precautions as indicated by assessment   - Educate patient/family on patient safety including physical limitations  - Instruct patient to call for assistance with activity based on assessment  - Modify environment to reduce risk of injury  - Consider OT/PT consult to assist with strengthening/mobility  Outcome: Progressing  Goal: Maintain or return to baseline ADL function  Description: INTERVENTIONS:  -  Assess patient's ability to carry out ADLs; assess patient's baseline for ADL function and identify physical deficits which impact ability to perform ADLs (bathing, care of mouth/teeth, toileting, grooming, dressing, etc )  - Assess/evaluate cause of self-care deficits   - Assess range of motion  - Assess patient's mobility; develop plan if impaired  - Assess patient's need for assistive devices and provide as appropriate  - Encourage maximum independence but intervene and supervise when necessary  - Involve family in performance of ADLs  - Assess for home care needs following discharge   - Consider OT consult to assist with ADL evaluation and planning for discharge  - Provide patient education as appropriate  Outcome: Progressing  Goal: Maintain or return mobility status to optimal level  Description: INTERVENTIONS:  - Assess patient's baseline mobility status (ambulation, transfers, stairs, etc )    - Identify cognitive and physical deficits and behaviors that affect mobility  - Identify mobility aids required to assist with transfers and/or ambulation (gait belt, sit-to-stand, lift, walker, cane, etc )  - [place] fall precautions as indicated by assessment  - Record patient progress and toleration of activity level on Mobility SBAR; progress patient to next Phase/Stage  - Instruct patient to call for assistance with activity based on assessment  - Consider rehabilitation consult to assist with strengthening/weightbearing, etc   Outcome: Progressing     Problem: DISCHARGE PLANNING  Goal: Discharge to home or other facility with appropriate resources  Description: INTERVENTIONS:  - Identify barriers to discharge w/patient and caregiver  - Arrange for needed discharge resources and transportation as appropriate  - Identify discharge learning needs (meds, wound care, etc )  - Arrange for interpretive services to assist at discharge as needed  - Refer to Case Management Department for coordinating discharge planning if the patient needs post-hospital services based on physician/advanced practitioner order or complex needs related to functional status, cognitive ability, or social support system  Outcome: Progressing     Problem: Knowledge Deficit  Goal: Patient/family/caregiver demonstrates understanding of disease process, treatment plan, medications, and discharge instructions  Description: Complete learning assessment and assess knowledge base    Interventions:  - Provide teaching at level of understanding  - Provide teaching via preferred learning methods  Outcome: Progressing     Problem: CARDIOVASCULAR - ADULT  Goal: Maintains optimal cardiac output and hemodynamic stability  Description: INTERVENTIONS:  - Monitor I/O, vital signs and rhythm  - Monitor for S/S and trends of decreased cardiac output  - Administer and titrate ordered vasoactive medications to optimize hemodynamic stability  - Assess quality of pulses, skin color and temperature  - Assess for signs of decreased coronary artery perfusion  - Instruct patient to report change in severity of symptoms  Outcome: Progressing  Goal: Absence of cardiac dysrhythmias or at baseline rhythm  Description: INTERVENTIONS:  - Continuous cardiac monitoring, vital signs, obtain 12 lead EKG if ordered  - Administer antiarrhythmic and heart rate control medications as ordered  - Monitor electrolytes and administer replacement therapy as ordered  Outcome: Progressing     Problem: RESPIRATORY - ADULT  Goal: Achieves optimal ventilation and oxygenation  Description: INTERVENTIONS:  - Assess for changes in respiratory status  - Assess for changes in mentation and behavior  - Position to facilitate oxygenation and minimize respiratory effort  - Oxygen administered by appropriate delivery if ordered  - Initiate smoking cessation education as indicated  - Encourage broncho-pulmonary hygiene including cough, deep breathe, Incentive Spirometry  - Assess the need for suctioning and aspirate as needed  - Assess and instruct to report SOB or any respiratory difficulty  - Respiratory Therapy support as indicated  Outcome: Progressing     Problem: GENITOURINARY - ADULT  Goal: Maintains or returns to baseline urinary function  Description: INTERVENTIONS:  - Assess urinary function  - Encourage oral fluids to ensure adequate hydration if ordered  - Administer IV fluids as ordered to ensure adequate hydration  - Administer ordered medications as needed  - Offer frequent toileting  - Follow urinary retention protocol if ordered  Outcome: Progressing  Goal: Absence of urinary retention  Description: INTERVENTIONS:  - Assess patients ability to void and empty bladder  - Monitor I/O  - Bladder scan as needed  - Discuss with physician/AP medications to alleviate retention as needed  - Discuss catheterization for long term situations as appropriate  Outcome: Progressing     Problem: METABOLIC, FLUID AND ELECTROLYTES - ADULT  Goal: Electrolytes maintained within normal limits  Description: INTERVENTIONS:  - Monitor labs and assess patient for signs and symptoms of electrolyte imbalances  - Administer electrolyte replacement as ordered  - Monitor response to electrolyte replacements, including repeat lab results as appropriate  - Instruct patient on fluid and nutrition as appropriate  Outcome: Progressing  Goal: Fluid balance maintained  Description: INTERVENTIONS:  - Monitor labs   - Monitor I/O and WT  - Instruct patient on fluid and nutrition as appropriate  - Assess for signs & symptoms of volume excess or deficit  Outcome: Progressing  Goal: Glucose maintained within target range  Description: INTERVENTIONS:  - Monitor Blood Glucose as ordered  - Assess for signs and symptoms of hyperglycemia and hypoglycemia  - Administer ordered medications to maintain glucose within target range  - Assess nutritional intake and initiate nutrition service referral as needed  Outcome: Progressing     Problem: MUSCULOSKELETAL - ADULT  Goal: Maintain or return mobility to safest level of function  Description: INTERVENTIONS:  - Assess patient's ability to carry out ADLs; assess patient's baseline for ADL function and identify physical deficits which impact ability to perform ADLs (bathing, care of mouth/teeth, toileting, grooming, dressing, etc )  - Assess/evaluate cause of self-care deficits   - Assess range of motion  - Assess patient's mobility  - Assess patient's need for assistive devices and provide as appropriate  - Encourage maximum independence but intervene and supervise when necessary  - Involve family in performance of ADLs  - Assess for home care needs following discharge   - Consider OT consult to assist with ADL evaluation and planning for discharge  - Provide patient education as appropriate  Outcome: Progressing  Goal: Maintain proper alignment of affected body part  Description: INTERVENTIONS:  - Support, maintain and protect limb and body alignment  - Provide patient/ family with appropriate education  Outcome: Progressing

## 2021-02-11 NOTE — PHYSICAL THERAPY NOTE
Physical Therapy Treatment Note       02/11/21 1215   Note Type   Note Type Treatment   Pain Assessment   Pain Assessment Tool 0-10   Pain Score 8  (with mobility 5 at rest)   Pain Location/Orientation Orientation: Right;Location: Foot; Location: Leg   Hospital Pain Intervention(s) Repositioned; Ambulation/increased activity; Emotional support;Rest;Elevated   Restrictions/Precautions   RLE Weight Bearing Per Order NWB   Braces or Orthoses CAM Boot  (R Le pt dons I'ly at EOB)   General   Chart Reviewed Yes   Family/Caregiver Present No   Cognition   Overall Cognitive Status WFL   Subjective   Subjective " It's better than it was "     Bed Mobility   Supine to Sit 7  Independent   Sit to Supine 7  Independent   Transfers   Sit to Stand 6  Modified independent   Additional items Assist x 1; Increased time required   Stand to Sit 5  Supervision   Additional items Assist x 1;Verbal cues  (cues for safe technique)   Ambulation/Elevation   Gait pattern   (hop to gait pattern)   Gait Assistance 5  Supervision   Additional items Assist x 1   Assistive Device Rolling walker   Distance 20' x1, 25' x1, 10' x2   Stair Management Assistance 5  Supervision  (min-progressing to cg/ close supervision  )   Additional items Assist x 1;Verbal cues   Stair Management Technique One rail R;Foreward; With crutches;Nonreciprocal  (one crutch)   Number of Stairs   (5 steps x4)   Balance   Static Sitting Normal   Dynamic Sitting Good   Static Standing Good  (rw)   Dynamic Standing Fair +  (rw)   Ambulatory Good  (Rw)   Endurance Deficit   Endurance Deficit Yes   Endurance Deficit Description fatigue   Activity Tolerance   Activity Tolerance Patient limited by fatigue   Medical Staff Made Aware Spenser GUTIERREZ   Nurse Made Aware yes   Equipment Use   Comments PT ed on stair climbing with use of R rail and One crutch L and guarding techniques when performing stair climbing  car transfers       Assessment   Prognosis Good   Problem List Decreased strength;Decreased endurance; Impaired balance;Decreased mobility; Decreased safety awareness;Orthopedic restrictions;Pain   Assessment Pt  Seen for PT treatment interventions of bed mobility, transfers gait and stair training and pt education  Pt  Showing good progress toward PT goals  Bed mobility I, transfers with mod I sit to stand and supervision stand to sit with verbal cues for proper and safe approach, use of rw when turning and backing up to chair  Pt  Dons cam boot I'ly  Pt maintains NWB to R le 100% of the time  Pt progressed to stair climbing and performs with use of R rail and one crutch on L with min assist x1 progressing to cg- close supervision x1, verbal cues for correct stair climbing techniques and positioning of R le when ascending and descending which improved with consecutive trials  No gross lob noted  No dizziness this session, fatigue reported, requiring seated rest breaks with stair climbing  Pt  Performs car transfers with supervision simulated with staxi chair  Recommend d/c to home with family support and A PRN and HHPT and home safety eval   Pt returned to supine with R le elevated  Goals   Patient Goals " to go home "    STG Expiration Date 02/18/21   PT Treatment Day 2   Plan   Treatment/Interventions Functional transfer training;Elevations; Endurance training;Patient/family training;Equipment eval/education; Bed mobility;Gait training;Spoke to nursing;Spoke to case management   Progress Progressing toward goals   PT Frequency   (4-6x/week)   Recommendation   PT Discharge Recommendation Return to previous environment with social support;Home with skilled therapy  (HHPT and home safety eval)   PT - OK to Discharge Yes        02/11/21 5335   Note Type   Note Type Treatment   Pain Assessment   Pain Assessment Tool 0-10   Pain Score 8  (with mobility 5 at rest)   Pain Location/Orientation Orientation: Right;Location: Foot; Location: Leg   Hospital Pain Intervention(s) Repositioned; Ambulation/increased activity; Emotional support;Rest;Elevated   Restrictions/Precautions   RLE Weight Bearing Per Order NWB   Braces or Orthoses CAM Boot  (R Le pt dons I'ly at EOB)   General   Chart Reviewed Yes   Family/Caregiver Present No   Cognition   Overall Cognitive Status WFL   Subjective   Subjective " It's better than it was "     Bed Mobility   Supine to Sit 7  Independent   Sit to Supine 7  Independent   Transfers   Sit to Stand 6  Modified independent   Additional items Assist x 1; Increased time required   Stand to Sit 5  Supervision   Additional items Assist x 1;Verbal cues  (cues for safe technique)   Ambulation/Elevation   Gait pattern   (hop to gait pattern)   Gait Assistance 5  Supervision   Additional items Assist x 1   Assistive Device Rolling walker   Distance 20' x1, 25' x1, 10' x2   Stair Management Assistance 5  Supervision  (min-progressing to cg/ close supervision  )   Additional items Assist x 1;Verbal cues   Stair Management Technique One rail R;Foreward; With crutches;Nonreciprocal  (one crutch)   Number of Stairs   (5 steps x4)   Balance   Static Sitting Normal   Dynamic Sitting Good   Static Standing Good  (rw)   Dynamic Standing Fair +  (rw)   Ambulatory Good  (Rw)   Endurance Deficit   Endurance Deficit Yes   Endurance Deficit Description fatigue   Activity Tolerance   Activity Tolerance Patient limited by fatigue   Medical Staff Made Aware RNJames   Nurse Made Aware yes   Equipment Use   Comments PT ed on stair climbing with use of R rail and One crutch L and guarding techniques when performing stair climbing  car transfers  Assessment   Prognosis Good   Problem List Decreased strength;Decreased endurance; Impaired balance;Decreased mobility; Decreased safety awareness;Orthopedic restrictions;Pain   Assessment Pt  Seen for PT treatment interventions of bed mobility, transfers gait and stair training and pt education  Pt  Showing good progress toward PT goals   Bed mobility I, transfers with mod I sit to stand and supervision stand to sit with verbal cues for proper and safe approach, use of rw when turning and backing up to chair  Pt  Dons cam boot I'ly  Pt maintains NWB to R le 100% of the time  Pt progressed to stair climbing and performs with use of R rail and one crutch on L with min assist x1 progressing to cg- close supervision x1, verbal cues for correct stair climbing techniques and positioning of R le when ascending and descending which improved with consecutive trials  No gross lob noted  No dizziness this session, fatigue reported, requiring seated rest breaks with stair climbing  Pt  Performs car transfers with supervision simulated with staxi chair  Recommend d/c to home with family support and A PRN and HHPT and home safety eval   Pt returned to supine with R le elevated  Goals   Patient Goals " to go home "    STG Expiration Date 02/18/21   PT Treatment Day 2   Plan   Treatment/Interventions Functional transfer training;Elevations; Endurance training;Patient/family training;Equipment eval/education; Bed mobility;Gait training;Spoke to nursing;Spoke to case management   Progress Progressing toward goals   PT Frequency   (4-6x/week)   Recommendation   PT Discharge Recommendation Return to previous environment with social support;Home with skilled therapy  (HHPT and home safety eval)   PT - OK to Discharge Yes     Kar Myers, PTA

## 2021-02-11 NOTE — PLAN OF CARE
Problem: PHYSICAL THERAPY ADULT  Goal: Performs mobility at highest level of function for planned discharge setting  See evaluation for individualized goals  Description: Treatment/Interventions: LE strengthening/ROM, Functional transfer training, Elevations, Therapeutic exercise, Endurance training, Patient/family training, Equipment eval/education, Bed mobility, Gait training, Compensatory technique education, Continued evaluation, Spoke to nursing, Spoke to advanced practitioner  Equipment Recommended: Karla Harkins (Comment)(RW, BSC)       See flowsheet documentation for full assessment, interventions and recommendations  2/11/2021 1736 by Coleman Castle PTA  Outcome: Adequate for Discharge  Note: Prognosis: Good  Problem List: Decreased strength, Decreased endurance, Impaired balance, Decreased mobility, Decreased safety awareness, Orthopedic restrictions, Pain  Assessment: Pt  Seen for PT treatment interventions of bed mobility, transfers gait and stair training and pt education  Pt  Showing good progress toward PT goals  Bed mobility I, transfers with mod I sit to stand and supervision stand to sit with verbal cues for proper and safe approach, use of rw when turning and backing up to chair  Pt  Dons cam boot I'ly  Pt maintains NWB to R le 100% of the time  Pt progressed to stair climbing and performs with use of R rail and one crutch on L with min assist x1 progressing to cg- close supervision x1, verbal cues for correct stair climbing techniques and positioning of R le when ascending and descending which improved with consecutive trials  No gross lob noted  No dizziness this session, fatigue reported, requiring seated rest breaks with stair climbing  Pt  Performs car transfers with supervision simulated with staxi chair  Recommend d/c to home with family support and A PRN and HHPT and home safety eval   Pt returned to supine with R le elevated      Barriers to Discharge: Inaccessible home environment  Barriers to Discharge Comments: stairs  PT Discharge Recommendation: Return to previous environment with social support, Home with skilled therapy(HHPT and home safety eval)     PT - OK to Discharge: Yes    See flowsheet documentation for full assessment

## 2021-02-11 NOTE — DISCHARGE SUMMARY
Discharge- Tristan Gamez 1970, 53 y o  male MRN: [id number]    Unit/Bed#: [address] 222[address]01 Encounter: [id number]    Primary Care Provider: No primary care provider on file     Date and time admitted to hospital: 2/7/2021  7:19 PM        Hypertensive urgency  Assessment & Plan  Likely in setting of underlying uncontrolled hypertension  I reviewed last few ED visits back to 2018 with blood pressures 170-190/110's   Had been on hydrochlorothiazide in the past but has not taken in over a year  Systolic Blood pressures as high as 245 in emergency department  Nephrology consulted due to resistant hypertension    renal ultrasound revealed unremarkable kidneys  Spoke with Nephrology, has an appointment outpatient on Tuesday for follow up   Antihypertensive regimen now: norvasc 10 mg daily, coreg 12 5 bid and lisinopril 20 mg   Stop HCTZ    will follow with Nephrology outpatient for repeat BMP     * Fracture of right fibula  Assessment & Plan  Mechanical fall on black ice with Right fibula fracture  Orthopedics consultation appreciated   Conservative treatment   Cam walker boot   PT consulted - return to home with social support   Non-weightbearing RLE with crutches or walker   Pain control   Follow up with Dr Gabe Hale in 1 week for repeat xrays     Hyponatremia  Assessment & Plan  Stop HCTZ   Check BMP in AM     Normocytic anemia  Assessment & Plan  No active signs of bleeding   Unclear baseline no records to review   Could be aspect of anemia of chronic disease with likely underlying CKD   Outpatient workup with PCP     MITCHELL (acute kidney injury) ([facility] [address] )  Assessment & Plan  Unknown baseline creatinine   suspect underlying CKD secondary to chronic hypertensive nephrosclerosis  POA Cr 1 7 --> 1 38 --> 1 43 --> 1 50-->1 76   discussed with Nephrology, outpatient monitoring with repeat BMP and nephrology follow-up   has appointment to follow with nephrologist on 02/16/2021          Transition of Care Discharge Summary - [facility] [facility]    Patient Information: Imelda Alvarez 53 y o  male MRN: [id number]  Unit/Bed#: [address] 222-01 Encounter: [id number]    Discharging Physician / Practitioner: Jan Lucero MD  PCP: No primary care provider on file  Admission Date: 2/7/2021  Discharge Date: 02/11/21    Disposition:      Other: home      Reason for Admission:  Mechanical fall    Discharge Diagnoses:     Principal Problem:    Fracture of right fibula  Active Problems:    Hypertensive urgency    MITCHELL (acute kidney injury) ([facility] [address] )    Normocytic anemia    Hyponatremia  Resolved Problems:    * No resolved hospital problems  *      Consultations During Hospital Stay:  · IP CONSULT TO ORTHOPEDIC SURGERY  · IP CONSULT TO CASE MANAGEMENT  · IP CONSULT TO NEPHROLOGY      Procedures Performed:     ·  none    Medication Adjustments and Discharge Medications:  · Medication Dosing Tapers - Please refer to Discharge Medication List for details on any medication dosing tapers (if applicable to patient)  · Discharge Medication List: See after visit summary for reconciled discharge medications  Wound Care Recommendations:  When applicable, please see wound care section of After Visit Summary  Diet Recommendations at Discharge:  Diet -        Diet Orders   (From admission, onward)             Start     Ordered    02/08/21 1236  Diet Regular; Regular House; Sodium 2 GM  Diet effective now     Question Answer Comment   Diet Type Regular    Regular Regular House    Other Restriction(s): Sodium 2 GM    RD to adjust diet per protocol? Yes        02/08/21 1235              Fluid Restriction - No Fluid Restriction at Discharge  Significant Findings / Test Results:       X-ray right ankle  Widening of the medial malleolus  In conjunction with proximal fibular fracture, findings are suggestive of Maisonneuve fracture/injury      2  Findings suspicious for nondisplaced posterior malleolar fracture      · 3    Diffuse soft tissue swelling about the ankle greater medially  chest x-ray negative for any acute cardiopulmonary disease     ultrasound kidney bladder unremarkable    Hospital Course:     Shweta Palmer is a 53 y o  male patient who originally presented to the hospital on 2/7/2021 due to  Mechanical fall  Patient has history of hypertension not on any medications  Mukesh Turner while walking outside suffered a right fibula fracture for which Orthopedic consulted  Patient also noted to have hypertensive urgency for which ultimately nephrology consulted and patient started on blood pressure regimen which has improved his blood pressure  Also noted to have elevated creatinine possible underlying CKD  Patient will follow with Nephrology outpatient  He is otherwise stable for discharge home today and cleared from Nephrology standpoint  Please see above problem list for further details  Condition at Discharge: good     Discharge Day Visit / Exam:     Subjective:   Patient seen examined at bedside, denies any complaints    Vitals: Blood Pressure: 147/96 (02/11/21 0648)  Pulse: 74 (02/11/21 0648)  Temperature: 98 3 °F (36 8 °C) (02/11/21 0648)  Temp Source: Oral (02/10/21 1535)  Respirations: 18 (02/11/21 0648)  Weight - Scale: 86 7 kg (191 lb 2 2 oz) (02/10/21 0948)  SpO2: 98 % (02/11/21 8700)    Physical Exam:    Constitutional: Patient is oriented to person, place and time, no acute distress  HEENT:  Normocephalic, atraumatic  Cardiovascular: Normal S1S2, RRR, No murmurs/rubs/gallops appreciated  Pulmonary:  Bilateral air entry, No rhonchi/rales/wheezing appreciated  Abdominal: Soft, Bowel sounds present, Non-tender, Non-distended  Extremities:  No cyanosis, clubbing or edema  Neurological: Cranial nerves II-XII grossly intact, sensation intact, otherwise no focal neurological symptoms       Discharge instructions/Information to patient and family:   See after visit summary section titled Discharge Instructions for information provided to patient and family  Planned Readmission: no      Discharge Statement:  I spent 35 minutes discharging the patient  This time was spent on the day of discharge  I had direct contact with the patient on the day of discharge  Greater than 50% of the total time was spent examining patient, answering all patient questions, arranging and discussing plan of care with patient as well as directly providing post-discharge instructions  Additional time then spent on discharge activities      ** Please Note: This note has been constructed using a voice recognition system **

## 2021-02-11 NOTE — ASSESSMENT & PLAN NOTE
Likely in setting of underlying uncontrolled hypertension  I reviewed last few ED visits back to 2018 with blood pressures 170-190/110's   Had been on hydrochlorothiazide in the past but has not taken in over a year  Systolic Blood pressures as high as 245 in emergency department  Nephrology consulted due to resistant hypertension    renal ultrasound revealed unremarkable kidneys  Spoke with Nephrology, has an appointment outpatient on Tuesday for follow up   Antihypertensive regimen now: norvasc 10 mg daily, coreg 12 5 bid and lisinopril 20 mg   Stop HCTZ    will follow with Nephrology outpatient for repeat BMP

## 2021-02-12 ENCOUNTER — TELEPHONE (OUTPATIENT)
Dept: OBGYN CLINIC | Facility: CLINIC | Age: 51
End: 2021-02-12

## 2021-02-12 ENCOUNTER — TELEPHONE (OUTPATIENT)
Dept: CASE MANAGEMENT | Facility: HOSPITAL | Age: 51
End: 2021-02-12

## 2021-02-12 DIAGNOSIS — S82.401A CLOSED FRACTURE OF SHAFT OF RIGHT FIBULA, UNSPECIFIED FRACTURE MORPHOLOGY, INITIAL ENCOUNTER: Primary | ICD-10-CM

## 2021-02-12 RX ORDER — OXYCODONE HYDROCHLORIDE 5 MG/1
5 TABLET ORAL EVERY 6 HOURS PRN
Qty: 20 TABLET | Refills: 0 | Status: SHIPPED | OUTPATIENT
Start: 2021-02-12 | End: 2021-02-17

## 2021-02-12 NOTE — TELEPHONE ENCOUNTER
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

## 2021-02-12 NOTE — TELEPHONE ENCOUNTER
I did speak with the patient oxycodone was sent to the pharmacy in the patient's appointment was moved up for 02/17/2021

## 2021-02-12 NOTE — TELEPHONE ENCOUNTER
Received call from pt inquiring about obtaining further pain medications as he was sent home with a limited amount and the pain is unbearable  Informed pt to call orthopedic, Dr Ebonie Alvarado, to make an appointment and discuss pain medication- phone number provided for same  Notified earlier of pt declining HHPT asking for OPPT with attending on case at d/c notified of need for ambulatory referral for same- to be entered into EHR  Discussed location with pt with number for [phone] [facility] provided- informed of process to obtain appointment with verbal understanding of same given

## 2021-02-15 ENCOUNTER — TELEPHONE (OUTPATIENT)
Dept: NEPHROLOGY | Facility: CLINIC | Age: 51
End: 2021-02-15

## 2021-02-15 DIAGNOSIS — N17.9 AKI (ACUTE KIDNEY INJURY) (HCC): Primary | ICD-10-CM

## 2021-02-15 DIAGNOSIS — E87.1 HYPONATREMIA: ICD-10-CM

## 2021-02-15 NOTE — TELEPHONE ENCOUNTER
Tried calling patient to remind him of his appointment on 2/16 with [facility], [address]  There is blood work to be done for the appointment, no answer and no voicemail was set up

## 2021-02-16 ENCOUNTER — LAB (OUTPATIENT)
Dept: LAB | Facility: HOSPITAL | Age: 51
End: 2021-02-16
Payer: COMMERCIAL

## 2021-02-16 ENCOUNTER — OFFICE VISIT (OUTPATIENT)
Dept: NEPHROLOGY | Facility: CLINIC | Age: 51
End: 2021-02-16

## 2021-02-16 VITALS
RESPIRATION RATE: 16 BRPM | BODY MASS INDEX: 25.22 KG/M2 | SYSTOLIC BLOOD PRESSURE: 132 MMHG | DIASTOLIC BLOOD PRESSURE: 92 MMHG | HEIGHT: 73 IN | HEART RATE: 76 BPM

## 2021-02-16 DIAGNOSIS — I16.0 HYPERTENSIVE URGENCY: Primary | ICD-10-CM

## 2021-02-16 DIAGNOSIS — S82.401A CLOSED FRACTURE OF SHAFT OF RIGHT FIBULA, UNSPECIFIED FRACTURE MORPHOLOGY, INITIAL ENCOUNTER: ICD-10-CM

## 2021-02-16 DIAGNOSIS — N17.9 AKI (ACUTE KIDNEY INJURY) (HCC): ICD-10-CM

## 2021-02-16 DIAGNOSIS — E87.1 HYPONATREMIA: ICD-10-CM

## 2021-02-16 LAB
ANION GAP SERPL CALCULATED.3IONS-SCNC: 5 MMOL/L (ref 4–13)
BUN SERPL-MCNC: 25 MG/DL (ref 5–25)
CALCIUM SERPL-MCNC: 9.4 MG/DL (ref 8.3–10.1)
CHLORIDE SERPL-SCNC: 99 MMOL/L (ref 100–108)
CO2 SERPL-SCNC: 30 MMOL/L (ref 21–32)
CREAT SERPL-MCNC: 1.6 MG/DL (ref 0.6–1.3)
GFR SERPL CREATININE-BSD FRML MDRD: 58 ML/MIN/1.73SQ M
GLUCOSE SERPL-MCNC: 80 MG/DL (ref 65–140)
MAGNESIUM SERPL-MCNC: 2.6 MG/DL (ref 1.6–2.6)
PHOSPHATE SERPL-MCNC: 2.6 MG/DL (ref 2.7–4.5)
POTASSIUM SERPL-SCNC: 5.2 MMOL/L (ref 3.5–5.3)
SODIUM SERPL-SCNC: 134 MMOL/L (ref 136–145)

## 2021-02-16 PROCEDURE — 84100 ASSAY OF PHOSPHORUS: CPT

## 2021-02-16 PROCEDURE — 80048 BASIC METABOLIC PNL TOTAL CA: CPT

## 2021-02-16 PROCEDURE — 99214 OFFICE O/P EST MOD 30 MIN: CPT | Performed by: NURSE PRACTITIONER

## 2021-02-16 PROCEDURE — 36415 COLL VENOUS BLD VENIPUNCTURE: CPT

## 2021-02-16 PROCEDURE — 83735 ASSAY OF MAGNESIUM: CPT

## 2021-02-16 NOTE — PROGRESS NOTES
OFFICE FOLLOW UP - Nephrology   Yaa Castillo 53 y o  male MRN: [id number]       ASSESSMENT and PLAN:  Diagnoses and all orders for this visit:    Hypertensive urgency    MITCHELL (acute kidney injury) ([facility] [address] )  -     Basic metabolic panel; Future  -     Magnesium; Future  -     Phosphorus; Future  -     Protein / creatinine ratio, urine; Future  -     Urinalysis with microscopic; Future  -     PTH, intact; Future  -     Vitamin D 25 hydroxy; Future    Closed fracture of shaft of right fibula, unspecified fracture morphology, initial encounter      Acute kidney injury (POA to hospital) versus chronic kidney disease:  - suspect underlying chronic kidney disease secondary to uncontrolled hypertension/ hypertensive nephrosclerosis, NSAID nephropathy  - unknown baseline creatinine    - creatinine 1 72 mg/dL upon admission to hospital on 02/07   - most recent creatinine 1 76 mg/dL on 2/11/21               - will complete lab studies today post visit  - please see medication regimen below  - UA completed on 02/08 revealed urine specific gravity > 1 030, no protein, no blood, 0-1 WBC, occasional bacteria  - renal ultrasound completed on 02/10/2021 revealed right kidney 10 6 x 4 6 x 5 7 cm, left kidney 11 8 x 4 7 x 4 8 cm  Normal echogenicity and contour bilaterally  - avoid NSAIDs, nephrotoxic agents  - encouraged adequate oral intake  - will check urine and lab studies prior to next office follow-up  Patient will follow with Dr Jak Wolf in 3 months       Electrolytes, acid/base:  - hyponatremia with most recent sodium 132     - will complete lab studies today post visit      - HCTZ discontinued during hospitalization  Blood pressure, hypertensive urgency:  - systolic blood pressure > 240 upon admission to hospital    - blood pressure during visit: 132/92  - currently on: Amlodipine 10 mg daily, coreg 12 5 mg b i d , lisinopril 20 mg daily  - recommendations: no changes for now     - plasma metanephrines, catecholamines, renin, aldosterone pending    - educated patient on the importance to check blood pressures twice daily  Patient has purchased a blood pressure cuff and will continue to check his blood pressures  - advised low-sodium diet       H/H, anemia:  - most recent hemoglobin 12 3 grams/deciliter   - goal hemoglobin greater than 8 grams/deciliter  - iron panel revealed: iron saturation 29%, ferritin 259       Other medical problems:  - fracture of right fibula, status post mechanical fall on black ice:              - non-weight-bearing on right lower extremity  - plan per Orthopedics, with outpatient appointment tomorrow, 2/17/21  Patient will follow up in 3 months with Dr Grayson Angel  Age related screening: Your primary caregiver may do yearly screening for colorectal cancer  It is recommended in all men and women over 48years old  You may have screening earlier if you have colon disease or a family history of colorectal cancer  HPI: Thiago Platt is a 53 y o  male who is here for hospital follow-up  Patient was recently admitted to the hospital status post fall and found to have right fibula fracture  Nephrology was consulted for evaluation of management of hypertension and acute versus chronic kidney disease  Upon visit, patient has been doing well post hospitalization  Patient is without shortness of breath, chest pain, nausea, vomiting, diarrhea  Patient is very eager to make healthy changes in his life including making changes in his diet  As noted above, patient will obtain lab studies post visit  Patient will continue to check his blood pressures twice daily  He states that his vision has improved and that he is without dizziness, lightheadedness  ROS:   A complete review of systems was done  Pertinent positives and negatives as noted in the HPI, otherwise the review of systems is negative  Allergies: Patient has no known allergies      Medications: Current Outpatient Medications:     amLODIPine (NORVASC) 10 mg tablet, Take 1 tablet (10 mg total) by mouth daily, Disp: 30 tablet, Rfl: 0    carvedilol (COREG) 12 5 mg tablet, Take 1 tablet (12 5 mg total) by mouth 2 (two) times a day with meals, Disp: 30 tablet, Rfl: 0    lisinopril (ZESTRIL) 20 mg tablet, Take 1 tablet (20 mg total) by mouth daily, Disp: 20 tablet, Rfl: 0    oxyCODONE (ROXICODONE) 5 mg immediate release tablet, Take 1 tablet (5 mg total) by mouth every 4 (four) hours as needed for moderate painMax Daily Amount: 30 mg (Patient not taking: Reported on 2/16/2021), Disp: 4 tablet, Rfl: 0    oxyCODONE (ROXICODONE) 5 mg immediate release tablet, Take 1 tablet (5 mg total) by mouth every 6 (six) hours as needed for moderate pain for up to 10 daysMax Daily Amount: 20 mg (Patient not taking: Reported on 2/16/2021), Disp: 20 tablet, Rfl: 0    Past Medical History:   Diagnosis Date    Hypertension      Past Surgical History:   Procedure Laterality Date    MANDIBLE FRACTURE SURGERY       Family History   Problem Relation Age of Onset    Hypertension Mother     Kidney disease Mother     No Known Problems Father     Kidney disease Sister       reports that he has never smoked  He has never used smokeless tobacco  He reports previous alcohol use  He reports current drug use  Drug: Marijuana  Physical Exam:   Vitals:    02/16/21 1040   BP: 132/92   BP Location: Left arm   Patient Position: Sitting   Cuff Size: Standard   Pulse: 76   Resp: 16   Height: 6' 1" (1 854 m)     Body mass index is 25 22 kg/m²      General: no acute distress   Eyes: conjunctivae pink, anicteric sclerae  ENT: mucous membranes moist  Neck: supple with trachea midline   Chest: clear to auscultation bilaterally with no wheezes, rale or rhochi  CVS: regular rate and rhythm   Abdomen: soft, non-tender, non-distended  Extremities: no lower extremity edema, right brace in place  Skin: no rash, warm   Neuro: awake and alert Lab Results:  Results for orders placed or performed during the hospital encounter of 02/07/21   CBC and differential   Result Value Ref Range    WBC 6 15 4 31 - 10 16 Thousand/uL    RBC 4 28 3 88 - 5 62 Million/uL    Hemoglobin 11 5 (L) 12 0 - 17 0 g/dL    Hematocrit 36 9 36 5 - 49 3 %    MCV 86 82 - 98 fL    MCH 26 9 26 8 - 34 3 pg    MCHC 31 2 (L) 31 4 - 37 4 g/dL    RDW 13 3 11 6 - 15 1 %    MPV 10 8 8 9 - 12 7 fL    Platelets 584 498 - 809 Thousands/uL    nRBC 0 /100 WBCs    Neutrophils Relative 68 43 - 75 %    Immat GRANS % 0 0 - 2 %    Lymphocytes Relative 24 14 - 44 %    Monocytes Relative 8 4 - 12 %    Eosinophils Relative 0 0 - 6 %    Basophils Relative 0 0 - 1 %    Neutrophils Absolute 4 12 1 85 - 7 62 Thousands/µL    Immature Grans Absolute 0 02 0 00 - 0 20 Thousand/uL    Lymphocytes Absolute 1 47 0 60 - 4 47 Thousands/µL    Monocytes Absolute 0 51 0 17 - 1 22 Thousand/µL    Eosinophils Absolute 0 02 0 00 - 0 61 Thousand/µL    Basophils Absolute 0 01 0 00 - 0 10 Thousands/µL   Protime-INR   Result Value Ref Range    Protime 13 9 11 6 - 14 5 seconds    INR 1 09 0 84 - 1 19   APTT   Result Value Ref Range    PTT 28 23 - 37 seconds   Comprehensive metabolic panel   Result Value Ref Range    Sodium 139 136 - 145 mmol/L    Potassium 3 8 3 5 - 5 3 mmol/L    Chloride 104 100 - 108 mmol/L    CO2 28 21 - 32 mmol/L    ANION GAP 7 4 - 13 mmol/L    BUN 17 5 - 25 mg/dL    Creatinine 1 72 (H) 0 60 - 1 30 mg/dL    Glucose 111 65 - 140 mg/dL    Calcium 9 0 8 3 - 10 1 mg/dL    AST 22 5 - 45 U/L    ALT 24 12 - 78 U/L    Alkaline Phosphatase 71 46 - 116 U/L    Total Protein 8 4 (H) 6 4 - 8 2 g/dL    Albumin 4 0 3 5 - 5 0 g/dL    Total Bilirubin 0 33 0 20 - 1 00 mg/dL    eGFR 53 ml/min/1 73sq m   Troponin I   Result Value Ref Range    Troponin I <0 02 <=0 04 ng/mL   Urinalysis with microscopic   Result Value Ref Range    Clarity, UA Clear     Color, UA Yellow     Specific Gravity, UA >=1 030 1 003 - 1 030    pH, UA 5 5 4 5, 5 0, 5 5, 6 0, 6 5, 7 0, 7 5, 8 0    Glucose, UA Negative Negative mg/dl    Ketones, UA Negative Negative mg/dl    Blood, UA Negative Negative    Protein, UA Negative Negative mg/dl    Nitrite, UA Negative Negative    Bilirubin, UA Negative Negative    Urobilinogen, UA 0 2 0 2, 1 0 E U /dl E U /dl    Leukocytes, UA Negative Negative    WBC, UA 0-1 (A) None Seen, 2-4 /hpf    RBC, UA None Seen None Seen, 2-4 /hpf    Bacteria, UA Occasional None Seen, Occasional /hpf    Epithelial Cells Occasional None Seen, Occasional /hpf   CK (with reflex to MB)   Result Value Ref Range    Total  (H) 39 - 308 U/L   CKMB   Result Value Ref Range    CK-MB Index <1 0 0 0 - 2 5 %    CK-MB 1 0 0 0 - 5 0 ng/mL   Basic metabolic panel   Result Value Ref Range    Sodium 139 136 - 145 mmol/L    Potassium 3 9 3 5 - 5 3 mmol/L    Chloride 105 100 - 108 mmol/L    CO2 24 21 - 32 mmol/L    ANION GAP 10 4 - 13 mmol/L    BUN 15 5 - 25 mg/dL    Creatinine 1 38 (H) 0 60 - 1 30 mg/dL    Glucose 95 65 - 140 mg/dL    Glucose, Fasting 95 65 - 99 mg/dL    Calcium 8 4 8 3 - 10 1 mg/dL    eGFR 69 ml/min/1 73sq m   CBC (With Platelets)   Result Value Ref Range    WBC 5 83 4 31 - 10 16 Thousand/uL    RBC 3 78 (L) 3 88 - 5 62 Million/uL    Hemoglobin 10 5 (L) 12 0 - 17 0 g/dL    Hematocrit 33 0 (L) 36 5 - 49 3 %    MCV 87 82 - 98 fL    MCH 27 8 26 8 - 34 3 pg    MCHC 31 8 31 4 - 37 4 g/dL    RDW 13 3 11 6 - 15 1 %    Platelets 800 659 - 793 Thousands/uL    MPV 11 0 8 9 - 12 7 fL   Basic metabolic panel   Result Value Ref Range    Sodium 136 136 - 145 mmol/L    Potassium 4 3 3 5 - 5 3 mmol/L    Chloride 103 100 - 108 mmol/L    CO2 29 21 - 32 mmol/L    ANION GAP 4 4 - 13 mmol/L    BUN 12 5 - 25 mg/dL    Creatinine 1 43 (H) 0 60 - 1 30 mg/dL    Glucose 92 65 - 140 mg/dL    Calcium 8 6 8 3 - 10 1 mg/dL    eGFR 66 ml/min/1 73sq m   Basic metabolic panel   Result Value Ref Range    Sodium 131 (L) 136 - 145 mmol/L    Potassium 4 0 3 5 - 5 3 mmol/L Chloride 96 (L) 100 - 108 mmol/L    CO2 26 21 - 32 mmol/L    ANION GAP 9 4 - 13 mmol/L    BUN 17 5 - 25 mg/dL    Creatinine 1 50 (H) 0 60 - 1 30 mg/dL    Glucose 109 65 - 140 mg/dL    Calcium 9 4 8 3 - 10 1 mg/dL    eGFR 62 ml/min/1 73sq m   Basic metabolic panel   Result Value Ref Range    Sodium 132 (L) 136 - 145 mmol/L    Potassium 4 3 3 5 - 5 3 mmol/L    Chloride 97 (L) 100 - 108 mmol/L    CO2 25 21 - 32 mmol/L    ANION GAP 10 4 - 13 mmol/L    BUN 30 (H) 5 - 25 mg/dL    Creatinine 1 76 (H) 0 60 - 1 30 mg/dL    Glucose 93 65 - 140 mg/dL    Calcium 9 6 8 3 - 10 1 mg/dL    eGFR 51 ml/min/1 73sq m   Magnesium   Result Value Ref Range    Magnesium 2 3 1 6 - 2 6 mg/dL   Phosphorus   Result Value Ref Range    Phosphorus 2 9 2 7 - 4 5 mg/dL   CBC and Platelet   Result Value Ref Range    WBC 5 18 4 31 - 10 16 Thousand/uL    RBC 4 66 3 88 - 5 62 Million/uL    Hemoglobin 12 3 12 0 - 17 0 g/dL    Hematocrit 39 9 36 5 - 49 3 %    MCV 86 82 - 98 fL    MCH 26 4 (L) 26 8 - 34 3 pg    MCHC 30 8 (L) 31 4 - 37 4 g/dL    RDW 13 2 11 6 - 15 1 %    Platelets 744 990 - 758 Thousands/uL    MPV 10 9 8 9 - 12 7 fL   Iron Saturation %   Result Value Ref Range    Iron Saturation 29 %    TIBC 297 250 - 450 ug/dL    Iron 86 65 - 175 ug/dL   Ferritin   Result Value Ref Range    Ferritin 259 8 - 388 ng/mL   ECG 12 lead   Result Value Ref Range    Ventricular Rate 87 BPM    Atrial Rate 87 BPM    TX Interval 190 ms    QRSD Interval 92 ms    QT Interval 388 ms    QTC Interval 466 ms    P Axis 53 degrees    QRS Axis 29 degrees    T Wave [place] 23 degrees       Results from last 7 days   Lab Units 02/11/21 0623 02/11/21  0622 02/10/21  0457   WBC Thousand/uL  --  5 18  --    HEMOGLOBIN g/dL  --  12 3  --    HEMATOCRIT %  --  39 9  --    PLATELETS Thousands/uL  --  361  --    POTASSIUM mmol/L 4 3  --  4 0   CHLORIDE mmol/L 97*  --  96*   CO2 mmol/L 25  --  26   BUN mg/dL 30*  --  17   CREATININE mg/dL 1 76*  --  1 50*   CALCIUM mg/dL 9 6  --  9 4 MAGNESIUM mg/dL 2 3  --   --    PHOSPHORUS mg/dL 2 9  --   --          Portions of the record may have been created with voice recognition software  Occasional wrong word or "sound a like" substitutions may have occurred due to the inherent limitations of voice recognition software  Read the chart carefully and recognize, using context, where substitutions have occurred  If you have any questions, please contact the dictating provider

## 2021-02-17 ENCOUNTER — OFFICE VISIT (OUTPATIENT)
Dept: FAMILY MEDICINE CLINIC | Facility: CLINIC | Age: 51
End: 2021-02-17

## 2021-02-17 ENCOUNTER — APPOINTMENT (OUTPATIENT)
Dept: RADIOLOGY | Facility: OTHER | Age: 51
End: 2021-02-17

## 2021-02-17 ENCOUNTER — OFFICE VISIT (OUTPATIENT)
Dept: OBGYN CLINIC | Facility: OTHER | Age: 51
End: 2021-02-17

## 2021-02-17 ENCOUNTER — TELEPHONE (OUTPATIENT)
Dept: OTHER | Facility: HOSPITAL | Age: 51
End: 2021-02-17

## 2021-02-17 VITALS
DIASTOLIC BLOOD PRESSURE: 83 MMHG | BODY MASS INDEX: 24.78 KG/M2 | SYSTOLIC BLOOD PRESSURE: 122 MMHG | WEIGHT: 187 LBS | HEART RATE: 74 BPM | HEIGHT: 73 IN

## 2021-02-17 VITALS
TEMPERATURE: 97.8 F | OXYGEN SATURATION: 93 % | DIASTOLIC BLOOD PRESSURE: 90 MMHG | WEIGHT: 187 LBS | HEART RATE: 67 BPM | RESPIRATION RATE: 18 BRPM | SYSTOLIC BLOOD PRESSURE: 112 MMHG | HEIGHT: 73 IN | BODY MASS INDEX: 24.78 KG/M2

## 2021-02-17 DIAGNOSIS — I16.0 HYPERTENSIVE URGENCY: ICD-10-CM

## 2021-02-17 DIAGNOSIS — Z12.5 SCREENING PSA (PROSTATE SPECIFIC ANTIGEN): ICD-10-CM

## 2021-02-17 DIAGNOSIS — D64.9 NORMOCYTIC ANEMIA: ICD-10-CM

## 2021-02-17 DIAGNOSIS — S82.401A CLOSED FRACTURE OF SHAFT OF RIGHT FIBULA, UNSPECIFIED FRACTURE MORPHOLOGY, INITIAL ENCOUNTER: ICD-10-CM

## 2021-02-17 DIAGNOSIS — M25.571 PAIN, JOINT, ANKLE AND FOOT, RIGHT: ICD-10-CM

## 2021-02-17 DIAGNOSIS — E87.1 HYPONATREMIA: ICD-10-CM

## 2021-02-17 DIAGNOSIS — Z12.11 COLON CANCER SCREENING: ICD-10-CM

## 2021-02-17 DIAGNOSIS — Z00.01 ENCOUNTER FOR GENERAL ADULT MEDICAL EXAMINATION WITH ABNORMAL FINDINGS: ICD-10-CM

## 2021-02-17 DIAGNOSIS — S93.491A SYNDESMOTIC ANKLE SPRAIN, RIGHT, INITIAL ENCOUNTER: ICD-10-CM

## 2021-02-17 DIAGNOSIS — Z76.89 ENCOUNTER TO ESTABLISH CARE WITH NEW DOCTOR: Primary | ICD-10-CM

## 2021-02-17 DIAGNOSIS — S82.401A CLOSED FRACTURE OF SHAFT OF RIGHT FIBULA, UNSPECIFIED FRACTURE MORPHOLOGY, INITIAL ENCOUNTER: Primary | ICD-10-CM

## 2021-02-17 DIAGNOSIS — Z13.0 SCREENING FOR DEFICIENCY ANEMIA: ICD-10-CM

## 2021-02-17 DIAGNOSIS — N17.9 AKI (ACUTE KIDNEY INJURY) (HCC): ICD-10-CM

## 2021-02-17 DIAGNOSIS — N17.9 AKI (ACUTE KIDNEY INJURY) (HCC): Primary | ICD-10-CM

## 2021-02-17 PROBLEM — S82.861A: Status: ACTIVE | Noted: 2021-02-07

## 2021-02-17 LAB
DOPAMINE 24H UR-MRATE: <30 PG/ML (ref 0–48)
EPINEPH PLAS-MCNC: <15 PG/ML (ref 0–62)
NOREPINEPH PLAS-MCNC: 667 PG/ML (ref 0–874)

## 2021-02-17 PROCEDURE — 99203 OFFICE O/P NEW LOW 30 MIN: CPT | Performed by: NURSE PRACTITIONER

## 2021-02-17 PROCEDURE — 99213 OFFICE O/P EST LOW 20 MIN: CPT | Performed by: ORTHOPAEDIC SURGERY

## 2021-02-17 PROCEDURE — 73610 X-RAY EXAM OF ANKLE: CPT

## 2021-02-17 PROCEDURE — 73590 X-RAY EXAM OF LOWER LEG: CPT

## 2021-02-17 NOTE — ASSESSMENT & PLAN NOTE
Comprehensive outpatient nephrology visit yesterday  Reviewed labs, electrolytes normalizing, Cr decreased from one week ago  Continue with low sodium diet, handout provided  Follow nephrology

## 2021-02-17 NOTE — TELEPHONE ENCOUNTER
Called patient to review most recent blood work completed post visit yesterday, 2/16/21  Creatinine 1 60, stable  Potassium trending up to 5 2, educated patient on the importance of low potassium diet and drinking fluids  Repeat lab studies have been placed  Further, noted blood pressures from earlier visits today  Notified patient to continue to check his blood pressures at home and notify the office if he exhibits any dizziness, lightheadedness, or if his BP is on the lower side  All questions answered

## 2021-02-17 NOTE — ASSESSMENT & PLAN NOTE
Likely related to [facility]  Hospitalist service discharged for f/u with PCP  Reorder CBC and treat if necessary

## 2021-02-17 NOTE — PROGRESS NOTES
Orthopaedic Surgery - Office Note  Dominik Milligan (57 y o  male)   : 1970   MRN: [id number]  Encounter Date: 2021    Chief Complaint   Patient presents with    Right Lower Leg - Pain       Assessment / Plan   right ankle proximal fibula fracture with syndesmotic disruption Lethaniel Blades injury    ·  Stress views of the ankle are consistent with syndesmotic disruption, nonstress views show good alignment ankle  ·   Continue with cam walker boot at this time with nonweightbearing using crutches  Patient was instructed that he could come out of the boot at night for sleep and a few times during the day to perform range of motion of exercises of the ankle  It was stressed that he is nonweightbearing on that leg  ·   Continue with ice and analgesics as needed for pain  Return in about 5 weeks (around 3/24/2021) for  with repeat stress use of the right ankle and tib-fib views  History of Present Illness  Dominik Milligan is a 53 y o  male who presents for follow-up status post right ankle injury sustained on 2021 after fall  Patient is doing well with pain management at this time and following the instructions of nonweightbearing in the cam walker boot using crutches  He states that the swelling has continue to improve over time  He is taking mostly Tylenol and ice for the pain but does have some oxycodone if needed which she is taking very very sparingly at this time  Most of his pain at this time is around his ankle  Denies any distal numbness or tingling at this time  When asked where the pain is he has had most of his pain in the anterior medial aspect of the ankle  Review of Systems  Pertinent items are noted in HPI  All other systems were reviewed and are negative  Physical Exam  /83   Pulse 74   Ht 6' 1" (1 854 m)   Wt 84 8 kg (187 lb)   BMI 24 67 kg/m²   Cons: Appears well  No apparent distress  Psych: Alert  Oriented x3    Mood and affect normal   Eyes: PERRLA, EOMI  Resp: Normal effort  No audible wheezing or stridor  CV: Palpable pulse  No discernable arrhythmia  No LE edema  Lymph:  No palpable cervical, axillary, or inguinal lymphadenopathy  Skin: Warm  No palpable masses  No visible lesions  Neuro: Normal muscle tone  Normal and symmetric DTR's  Right Foot & Ankle Exam  Alignment:  Normal ankle alignment  Inspection:  Mild left ankle and lower leg swelling improved significantly from last week in the hospital   Mild ecchymosis noted throughout the lower extremity also  No erythema noted  No open wounds noted  Palpation:  Moderate tenderness at Anterior medial aspect of the ankle mortise more mild tenderness generally around the medial and anterior aspect of the ankle  No pain with palpation of the remainder of the lower extremity     ROM:  Not tested  Strength:  Not tested  Stability:  No objective ankle instablity  (-) Anterior  neutral and PF  (-) Talar Tilt  Tests:  (+) Syndesmosis squeeze test  (-) Mims test  (-) Peroneal tendon subluxation  Neurovascular:  Sensation intact in DP/SP/Yip/Sa/T nerve distributions  Sensation intact in all digital nerve distributions  Toes warm and perfused  Gait:  Not tested  Studies Reviewed  I have personally reviewed pertinent films in PACS  XR of right ankle and tibia - From 02/17/2021 shows proximal fibula fracture without significant displacement from prior x-rays from 02/07/2021  Also positive stress you for widening of the ankle mortise medially  AP views of the ankle show good alignment without stress  No other fracture or dislocation seen  Procedures  No procedures today  Medical, Surgical, Family, and Social History  The patient's medical history, family history, and social history, were reviewed and updated as appropriate      Past Medical History:   Diagnosis Date    Hypertension        Past Surgical History:   Procedure Laterality Date    MANDIBLE FRACTURE SURGERY         Family History   Problem Relation Age of Onset    Hypertension Mother     Kidney disease Mother     No Known Problems Father     Kidney disease Sister        Social History     Occupational History    Not on file   Tobacco Use    Smoking status: Never Smoker    Smokeless tobacco: Never Used   Substance and Sexual Activity    Alcohol use: Not Currently     Comment: Socially    Drug use: Yes     Types: Marijuana     Comment: THC occasionally    Sexual activity: Not on file       No Known Allergies      Current Outpatient Medications:     amLODIPine (NORVASC) 10 mg tablet, Take 1 tablet (10 mg total) by mouth daily, Disp: 30 tablet, Rfl: 0    carvedilol (COREG) 12 5 mg tablet, Take 1 tablet (12 5 mg total) by mouth 2 (two) times a day with meals, Disp: 30 tablet, Rfl: 0    lisinopril (ZESTRIL) 20 mg tablet, Take 1 tablet (20 mg total) by mouth daily, Disp: 20 tablet, Rfl: 0      Zorita PillingSANJIV    Scribe Attestation    I,:   am acting as a scribe while in the presence of the attending physician :       I,:   personally performed the services described in this documentation    as scribed in my presence :

## 2021-02-17 NOTE — PATIENT INSTRUCTIONS
Low-Sodium Diet   WHAT YOU NEED TO KNOW:   A low-sodium diet limits foods that are high in sodium (salt)  You will need to follow a low-sodium diet if you have high blood pressure, kidney disease, or heart failure  You may also need to follow this diet if you have a condition that is causing your body to retain (hold) extra fluid  You may need to limit the amount of sodium you eat in a day to 1,500 to 2,000 mg  Ask your healthcare provider how much sodium you can have each day  DISCHARGE INSTRUCTIONS:   How to use food labels to choose foods that are low in sodium:  Read food labels to find the amount of sodium they contain  The amount of sodium is listed in milligrams (mg)  The % Daily Value (DV) column tells you how much of your daily needs are met by 1 serving of the food for each nutrient listed  Choose foods that have less than 5% of the DV of sodium  These foods are considered low in sodium  Foods that have 20% or more of the DV of sodium are considered high in sodium  Some food labels may also list any of the following terms that tell you about the sodium content in the food:  · Sodium-free:  Less than 5 mg in each serving    · Very low sodium:  35 mg of sodium or less in each serving    · Low sodium:  140 mg of sodium or less in each serving    · Reduced sodium: At least 25% less sodium in each serving than the regular type    · Light in sodium:  50% less sodium in each serving    · Unsalted or no added salt:  No extra salt is added during processing (the food may still contain sodium)     Foods to avoid:  Salty foods are high in sodium  You should avoid the following:  · Processed foods:      ? Mixes for cornbread, biscuits, cake, and pudding     ? Instant foods, such as potatoes, cereals, noodles, and rice     ? Packaged foods, such as bread stuffing, rice and pasta mixes, snack dip mixes, and macaroni and cheese     ?  Canned foods, such as canned vegetables, soups, broths, sauces, and vegetable or tomato juice    ? Snack foods, such as salted chips, popcorn, pretzels, pork rinds, salted crackers, and salted nuts    ? Frozen foods, such as dinners, entrees, vegetables with sauces, and breaded meats    ? Sauerkraut, pickled vegetables, and other foods prepared in brine    · Meats and cheeses:      ? Smoked or cured meat, such as corned beef, [place], ham, hot dogs, and sausage    ? Canned meats or spreads, such as potted meats, sardines, anchovies, and imitation seafood    ? Deli or lunch meats, such as bologna, ham, turkey, and roast beef    ? Processed cheese, such as American cheese and cheese spreads    · Condiments, sauces, and seasonings:      ? Salt (¼ teaspoon of salt contains 575 mg of sodium)    ? Seasonings made with salt, such as garlic salt, celery salt, onion salt, and seasoned salt    ? Regular soy sauce, barbecue sauce, teriyaki sauce, steak sauce, Worcestershire sauce, and most flavored vinegars    ? Canned gravy and mixes     ? Regular condiments, such as mustard, ketchup, and salad dressings    ? Pickles and olives    ? Meat tenderizers and monosodium glutamate (MSG)    Foods to include:  Read the food label to find the amount of sodium in each serving  · Bread and cereal:  Try to choose breads with less than 80 mg of sodium per serving  ? Bread, roll, filiberto, tortilla, or unsalted crackers  ? Ready-to-eat cereals with less than 5% DV of sodium (examples include shredded wheat and puffed rice)    ? Pasta    · Vegetables and fruits:      ? Unsalted fresh, frozen, or canned vegetables    ? Fresh, frozen, or canned fruits    ? Fruit juice    · Dairy:  One serving has about 150 mg of sodium  ? Milk, all types    ? Yogurt    ? Hard cheese, such as cheddar, Swiss, [employer], or mozzarella    · Meat and other protein foods:  Some raw meats may have added sodium  ? Plain meats, fish, and poultry     ? Egg    · Other foods:      ? Homemade pudding    ?  Unsalted nuts, popcorn, or pretzels    ? Unsalted butter or margarine    Ways to decrease sodium:   · Add spices and herbs to foods instead of salt during cooking  Use salt-free seasonings to add flavor to foods  Examples include onion powder, garlic powder, basil, [place] powder, paprika, and parsley  Try lemon or lime juice or vinegar to give foods a tart flavor  Use hot peppers, pepper, or cayenne pepper to add a spicy flavor  · Do not keep a salt shaker at your kitchen table  This may help keep you from adding salt to food at the table  A teaspoon of salt has 2,300 mg of sodium  It may take time to get used to enjoying the natural flavor of food instead of adding salt  Talk to your healthcare provider before you use salt substitutes  Some salt substitutes have a high amount of potassium and need to be avoided if you have kidney disease  · Choose low-sodium foods at restaurants  Meals from restaurants are often high in sodium  Some restaurants have nutrition information on the menu that tells you the amount of sodium in their foods  If possible, ask for your food to be prepared with less, or no salt  · Shop for unsalted or low-sodium foods and snacks at the grocery store  Examples include unsalted or low-sodium broths, soups, and canned vegetables  Choose fresh or frozen vegetables instead  Choose unsalted nuts or seeds or fresh fruits or vegetables as snacks  Read food labels and choose salt-free, very low-sodium, or low-sodium foods  © Copyright [address] Information is for End User's use only and may not be sold, redistributed or otherwise used for commercial purposes  All illustrations and images included in CareNotes® are the copyrighted property of A D A M , Inc  or [facility] Rhoda Kaur   The above information is an  only  It is not intended as medical advice for individual conditions or treatments   Talk to your doctor, nurse or pharmacist before following any medical regimen to see if it is safe and effective for you

## 2021-02-17 NOTE — ASSESSMENT & PLAN NOTE
Following with Dr Bessy Baez from Ortho today  No swelling, redness, heat, maintains full sensation in toes, +4 pedal pulse

## 2021-02-17 NOTE — PROGRESS NOTES
Assessment/Plan:    Problem List Items Addressed This Visit        Cardiovascular and Mediastinum    Hypertensive urgency     Controlled  Continue regimen from hospital discharge as outpatient  Continue with home BP recordings BID  Call if consistently above 130/90's  Musculoskeletal and Integument    Fracture of right fibula     Following with Dr Larry Bear from Ortho today  No swelling, redness, heat, maintains full sensation in toes, +4 pedal pulse  Genitourinary    MITCHELL (acute kidney injury) [facility])     Comprehensive outpatient nephrology visit yesterday  Reviewed labs, electrolytes normalizing, Cr decreased from one week ago  Continue with low sodium diet, handout provided  Follow nephrology  Other    Normocytic anemia     Likely related to ACI  Hospitalist service discharged for f/u with PCP  Reorder CBC and treat if necessary  Hyponatremia     Stopped HCTZ in hospital   Reviewed labs  Normalizing  Following nephrology  Other Visit Diagnoses     Encounter to establish care with new doctor    -  Primary    Encounter for general adult medical examination with abnormal findings        Relevant Orders    Ambulatory referral to Dentistry    Colon cancer screening        Relevant Orders    Ambulatory referral to Gastroenterology    Screening PSA (prostate specific antigen)        Relevant Orders    PSA, total and free    Screening for deficiency anemia        Relevant Orders    CBC and differential            Return in about 3 months (around 5/17/2021)  I have spent 30 minutes with Patient  today in which greater than 50% of this time was spent in counseling/coordination of care regarding Diagnostic results, Prognosis, Risks and benefits of tx options, Intructions for management, Patient and family education, Importance of tx compliance, Risk factor reductions and Impressions      Subjective:     HPI: Jessie Dumont is a 53 y o  male who  has a past medical history of Hypertension  who presented to the office today to establish as a new patient  He was discharged from the hospital on 02/11/2021 from an acute kidney injury  He was taken off of HCTZ due to hyponatremia  Yesterday he followed with Nephrology and had a comprehensive visit  His lab work is trending towards normalization of electrolytes  He and his partner understand the importance of sticking to a low-sodium diet, staying well hydrated, and monitoring blood pressure  He also had hypertensive crisis with blood pressures in the mid 218'Z systolic  He was started calcium channel blocker, ACE-inhibitor, and beta-blocker  He is controlled at today's visit  He takes his blood pressures at home and states that they range up to the 130s over 90s  He denies chest pain or syncope  He states he used to have very poor vision before he knew he had hypertensive crisis  His vision is now improved however he could use an appointment from an eye doctor which he has never had  He also states he is concerned for his oral health but does not have a dentist   He states he has a family history of cancer but cannot recall what kind of cancer as he is less than 4 months away from his 51th birthday, he would like a referral to GI for a colonoscopy  He denies family history of prostate cancer but he would like to be screened for this as well  He also has a broken fibula for which he is following up with Dr Reggie Huerta 2 day  He remains in a boot and crutches  Pain is controlled with Tylenol  He has no other complaints at this time      The following portions of the patient's history were reviewed and updated as appropriate: allergies, current medications, past family history, past medical history, past social history, past surgical history, and problem list     Current Outpatient Medications on File Prior to Visit   Medication Sig Dispense Refill    amLODIPine (NORVASC) 10 mg tablet Take 1 tablet (10 mg total) by mouth daily 30 tablet 0    carvedilol (COREG) 12 5 mg tablet Take 1 tablet (12 5 mg total) by mouth 2 (two) times a day with meals 30 tablet 0    lisinopril (ZESTRIL) 20 mg tablet Take 1 tablet (20 mg total) by mouth daily 20 tablet 0    [DISCONTINUED] oxyCODONE (ROXICODONE) 5 mg immediate release tablet Take 1 tablet (5 mg total) by mouth every 4 (four) hours as needed for moderate painMax Daily Amount: 30 mg (Patient not taking: Reported on 2/16/2021) 4 tablet 0    [DISCONTINUED] oxyCODONE (ROXICODONE) 5 mg immediate release tablet Take 1 tablet (5 mg total) by mouth every 6 (six) hours as needed for moderate pain for up to 10 daysMax Daily Amount: 20 mg (Patient not taking: Reported on 2/16/2021) 20 tablet 0     No current facility-administered medications on file prior to visit  Review of Systems   Constitutional: Negative for activity change, chills, fatigue, fever and unexpected weight change  HENT: Negative for congestion, ear pain, hearing loss, rhinorrhea, sinus pressure and sore throat  Eyes: Negative for pain and visual disturbance  Respiratory: Negative for cough, shortness of breath and wheezing  Cardiovascular: Negative for chest pain, palpitations and leg swelling  Gastrointestinal: Negative for abdominal pain, nausea and vomiting  Genitourinary: Negative for dysuria and hematuria  Musculoskeletal: Negative for arthralgias, back pain, gait problem, joint swelling and myalgias  Skin: Negative for color change and rash  Neurological: Negative for dizziness, tremors, seizures, syncope, weakness, light-headedness and headaches  Psychiatric/Behavioral: Negative for agitation, behavioral problems, confusion, dysphoric mood, self-injury, sleep disturbance and suicidal ideas  The patient is not nervous/anxious  All other systems reviewed and are negative              Objective:    /90 (BP Location: Left arm, Patient Position: Sitting, Cuff Size: Standard) Pulse 67   Temp 97 8 °F (36 6 °C) (Temporal)   Resp 18   Ht 6' 1" (1 854 m)   Wt 84 8 kg (187 lb)   SpO2 93%   BMI 24 67 kg/m²     Physical Exam  Constitutional:       Appearance: Normal appearance  He is normal weight  HENT:      Head: Normocephalic  Right Ear: Tympanic membrane, ear canal and external ear normal  There is no impacted cerumen  Left Ear: Tympanic membrane, ear canal and external ear normal  There is no impacted cerumen  Nose: Nose normal       Mouth/Throat:      Mouth: Mucous membranes are moist    Eyes:      Extraocular Movements: Extraocular movements intact  Pupils: Pupils are equal, round, and reactive to light  Neck:      Musculoskeletal: Normal range of motion  Cardiovascular:      Rate and Rhythm: Normal rate and regular rhythm  Pulses: Normal pulses  Heart sounds: Normal heart sounds  Pulmonary:      Effort: Pulmonary effort is normal       Breath sounds: Normal breath sounds  Abdominal:      General: Bowel sounds are normal       Palpations: Abdomen is soft  Musculoskeletal:         General: No signs of injury  Right ankle: He exhibits decreased range of motion  Tenderness  Right lower leg: No edema  Left lower leg: No edema  Skin:     General: Skin is warm and dry  Capillary Refill: Capillary refill takes less than 2 seconds  Findings: No bruising, lesion or rash  Neurological:      General: No focal deficit present  Mental Status: He is alert and oriented to person, place, and time  Psychiatric:         Mood and Affect: Mood normal          Behavior: Behavior normal          Thought Content: Thought content normal          MUSA Dimas  02/17/21  9:42 AM    Patient Instructions     Low-Sodium Diet   WHAT YOU NEED TO KNOW:   A low-sodium diet limits foods that are high in sodium (salt)  You will need to follow a low-sodium diet if you have high blood pressure, kidney disease, or heart failure   You may also need to follow this diet if you have a condition that is causing your body to retain (hold) extra fluid  You may need to limit the amount of sodium you eat in a day to 1,500 to 2,000 mg  Ask your healthcare provider how much sodium you can have each day  DISCHARGE INSTRUCTIONS:   How to use food labels to choose foods that are low in sodium:  Read food labels to find the amount of sodium they contain  The amount of sodium is listed in milligrams (mg)  The % Daily Value (DV) column tells you how much of your daily needs are met by 1 serving of the food for each nutrient listed  Choose foods that have less than 5% of the DV of sodium  These foods are considered low in sodium  Foods that have 20% or more of the DV of sodium are considered high in sodium  Some food labels may also list any of the following terms that tell you about the sodium content in the food:  · Sodium-free:  Less than 5 mg in each serving    · Very low sodium:  35 mg of sodium or less in each serving    · Low sodium:  140 mg of sodium or less in each serving    · Reduced sodium: At least 25% less sodium in each serving than the regular type    · Light in sodium:  50% less sodium in each serving    · Unsalted or no added salt:  No extra salt is added during processing (the food may still contain sodium)     Foods to avoid:  Salty foods are high in sodium  You should avoid the following:  · Processed foods:      ? Mixes for cornbread, biscuits, cake, and pudding     ? Instant foods, such as potatoes, cereals, noodles, and rice     ? Packaged foods, such as bread stuffing, rice and pasta mixes, snack dip mixes, and macaroni and cheese     ? Canned foods, such as canned vegetables, soups, broths, sauces, and vegetable or tomato juice    ? Snack foods, such as salted chips, popcorn, pretzels, pork rinds, salted crackers, and salted nuts    ? Frozen foods, such as dinners, entrees, vegetables with sauces, and breaded meats    ?  Jone, pickled vegetables, and other foods prepared in brine    · Meats and cheeses:      ? Smoked or cured meat, such as corned beef, cortez, ham, hot dogs, and sausage    ? Canned meats or spreads, such as potted meats, sardines, anchovies, and imitation seafood    ? Deli or lunch meats, such as bologna, ham, turkey, and roast beef    ? Processed cheese, such as American cheese and cheese spreads    · Condiments, sauces, and seasonings:      ? Salt (¼ teaspoon of salt contains 575 mg of sodium)    ? Seasonings made with salt, such as garlic salt, celery salt, onion salt, and seasoned salt    ? Regular soy sauce, barbecue sauce, teriyaki sauce, steak sauce, Worcestershire sauce, and most flavored vinegars    ? Canned gravy and mixes     ? Regular condiments, such as mustard, ketchup, and salad dressings    ? Pickles and olives    ? Meat tenderizers and monosodium glutamate (MSG)    Foods to include:  Read the food label to find the amount of sodium in each serving  · Bread and cereal:  Try to choose breads with less than 80 mg of sodium per serving  ? Bread, roll, filiberto, tortilla, or unsalted crackers  ? Ready-to-eat cereals with less than 5% DV of sodium (examples include shredded wheat and puffed rice)    ? Pasta    · Vegetables and fruits:      ? Unsalted fresh, frozen, or canned vegetables    ? Fresh, frozen, or canned fruits    ? Fruit juice    · Dairy:  One serving has about 150 mg of sodium  ? Milk, all types    ? Yogurt    ? Hard cheese, such as cheddar, Swiss, [employer], or mozzarella    · Meat and other protein foods:  Some raw meats may have added sodium  ? Plain meats, fish, and poultry     ? Egg    · Other foods:      ? Homemade pudding    ? Unsalted nuts, popcorn, or pretzels    ? Unsalted butter or margarine    Ways to decrease sodium:   · Add spices and herbs to foods instead of salt during cooking  Use salt-free seasonings to add flavor to foods   Examples include onion powder, garlic powder, basil, ashton powder, paprika, and parsley  Try lemon or lime juice or vinegar to give foods a tart flavor  Use hot peppers, pepper, or cayenne pepper to add a spicy flavor  · Do not keep a salt shaker at your kitchen table  This may help keep you from adding salt to food at the table  A teaspoon of salt has 2,300 mg of sodium  It may take time to get used to enjoying the natural flavor of food instead of adding salt  Talk to your healthcare provider before you use salt substitutes  Some salt substitutes have a high amount of potassium and need to be avoided if you have kidney disease  · Choose low-sodium foods at restaurants  Meals from restaurants are often high in sodium  Some restaurants have nutrition information on the menu that tells you the amount of sodium in their foods  If possible, ask for your food to be prepared with less, or no salt  · Shop for unsalted or low-sodium foods and snacks at the grocery store  Examples include unsalted or low-sodium broths, soups, and canned vegetables  Choose fresh or frozen vegetables instead  Choose unsalted nuts or seeds or fresh fruits or vegetables as snacks  Read food labels and choose salt-free, very low-sodium, or low-sodium foods  © Copyright [address] Information is for End User's use only and may not be sold, redistributed or otherwise used for commercial purposes  All illustrations and images included in CareNotes® are the copyrighted property of [employer]  or [facility] Rhoda Kaur   The above information is an  only  It is not intended as medical advice for individual conditions or treatments  Talk to your doctor, nurse or pharmacist before following any medical regimen to see if it is safe and effective for you

## 2021-02-17 NOTE — ASSESSMENT & PLAN NOTE
Controlled  Continue regimen from hospital discharge as outpatient  Continue with home BP recordings BID  Call if consistently above 130/90's

## 2021-02-19 ENCOUNTER — TELEPHONE (OUTPATIENT)
Dept: NEPHROLOGY | Facility: CLINIC | Age: 51
End: 2021-02-19

## 2021-02-19 DIAGNOSIS — I16.0 HYPERTENSIVE URGENCY: Primary | ICD-10-CM

## 2021-02-19 LAB
ALDOST SERPL-MCNC: <1 NG/DL (ref 0–30)
RENIN PLAS-CCNC: 4.93 NG/ML/HR (ref 0.17–5.38)

## 2021-02-19 RX ORDER — LOSARTAN POTASSIUM 25 MG/1
25 TABLET ORAL DAILY
Qty: 30 TABLET | Refills: 1 | Status: SHIPPED | OUTPATIENT
Start: 2021-02-19 | End: 2021-03-02 | Stop reason: SDUPTHER

## 2021-02-19 NOTE — TELEPHONE ENCOUNTER
Called patient to review his concerns  Per patient, he has began to develop a cough  With this cough, he then becomes short of breath and becomes dizzy  Blood pressure check post episode: systolic of 984 per patient  At this time, will have patient discontinue his lisinopril  Will have patient continue the amlodipine 10 mg daily and coreg 12 5 mg twice daily in addition to Losartan 25 mg daily (script sent to pharmacy)  Patient will call the office next week with an update and also provide his blood pressure readings from the weekend  All questions and concerns reviewed with the patient

## 2021-02-22 LAB
METANEPH FREE SERPL-MCNC: 19.7 PG/ML (ref 0–88)
NORMETANEPHRINE SERPL-MCNC: 109.8 PG/ML (ref 0–125.8)

## 2021-03-01 DIAGNOSIS — I16.9 HYPERTENSIVE CRISIS: ICD-10-CM

## 2021-03-01 RX ORDER — CARVEDILOL 12.5 MG/1
12.5 TABLET ORAL 2 TIMES DAILY WITH MEALS
Qty: 30 TABLET | Refills: 0 | Status: SHIPPED | OUTPATIENT
Start: 2021-03-01 | End: 2021-03-16 | Stop reason: SDUPTHER

## 2021-03-01 NOTE — TELEPHONE ENCOUNTER
Patient called requesting refill  Patient also stated that since switching medications he has been feeling a lot better and has improved

## 2021-03-02 ENCOUNTER — TELEPHONE (OUTPATIENT)
Dept: NEPHROLOGY | Facility: CLINIC | Age: 51
End: 2021-03-02

## 2021-03-02 ENCOUNTER — LAB (OUTPATIENT)
Dept: LAB | Facility: HOSPITAL | Age: 51
End: 2021-03-02

## 2021-03-02 DIAGNOSIS — Z13.0 SCREENING FOR DEFICIENCY ANEMIA: ICD-10-CM

## 2021-03-02 DIAGNOSIS — I16.9 HYPERTENSIVE CRISIS: ICD-10-CM

## 2021-03-02 DIAGNOSIS — I16.0 HYPERTENSIVE URGENCY: ICD-10-CM

## 2021-03-02 DIAGNOSIS — N17.9 AKI (ACUTE KIDNEY INJURY) (HCC): ICD-10-CM

## 2021-03-02 DIAGNOSIS — Z12.5 SCREENING PSA (PROSTATE SPECIFIC ANTIGEN): ICD-10-CM

## 2021-03-02 LAB
ANION GAP SERPL CALCULATED.3IONS-SCNC: 7 MMOL/L (ref 4–13)
BASOPHILS # BLD AUTO: 0.03 THOUSANDS/ΜL (ref 0–0.1)
BASOPHILS NFR BLD AUTO: 1 % (ref 0–1)
BUN SERPL-MCNC: 19 MG/DL (ref 5–25)
CALCIUM SERPL-MCNC: 9.4 MG/DL (ref 8.3–10.1)
CHLORIDE SERPL-SCNC: 104 MMOL/L (ref 100–108)
CO2 SERPL-SCNC: 28 MMOL/L (ref 21–32)
CREAT SERPL-MCNC: 1.37 MG/DL (ref 0.6–1.3)
EOSINOPHIL # BLD AUTO: 0.1 THOUSAND/ΜL (ref 0–0.61)
EOSINOPHIL NFR BLD AUTO: 2 % (ref 0–6)
ERYTHROCYTE [DISTWIDTH] IN BLOOD BY AUTOMATED COUNT: 13.1 % (ref 11.6–15.1)
GFR SERPL CREATININE-BSD FRML MDRD: 70 ML/MIN/1.73SQ M
GLUCOSE SERPL-MCNC: 80 MG/DL (ref 65–140)
HCT VFR BLD AUTO: 34.8 % (ref 36.5–49.3)
HGB BLD-MCNC: 10.6 G/DL (ref 12–17)
IMM GRANULOCYTES # BLD AUTO: 0.01 THOUSAND/UL (ref 0–0.2)
IMM GRANULOCYTES NFR BLD AUTO: 0 % (ref 0–2)
LYMPHOCYTES # BLD AUTO: 1.23 THOUSANDS/ΜL (ref 0.6–4.47)
LYMPHOCYTES NFR BLD AUTO: 28 % (ref 14–44)
MAGNESIUM SERPL-MCNC: 2 MG/DL (ref 1.6–2.6)
MCH RBC QN AUTO: 27.2 PG (ref 26.8–34.3)
MCHC RBC AUTO-ENTMCNC: 30.5 G/DL (ref 31.4–37.4)
MCV RBC AUTO: 89 FL (ref 82–98)
MONOCYTES # BLD AUTO: 0.39 THOUSAND/ΜL (ref 0.17–1.22)
MONOCYTES NFR BLD AUTO: 9 % (ref 4–12)
NEUTROPHILS # BLD AUTO: 2.57 THOUSANDS/ΜL (ref 1.85–7.62)
NEUTS SEG NFR BLD AUTO: 60 % (ref 43–75)
NRBC BLD AUTO-RTO: 0 /100 WBCS
PHOSPHATE SERPL-MCNC: 3 MG/DL (ref 2.7–4.5)
PLATELET # BLD AUTO: 321 THOUSANDS/UL (ref 149–390)
PMV BLD AUTO: 11.2 FL (ref 8.9–12.7)
POTASSIUM SERPL-SCNC: 3.9 MMOL/L (ref 3.5–5.3)
RBC # BLD AUTO: 3.9 MILLION/UL (ref 3.88–5.62)
SODIUM SERPL-SCNC: 139 MMOL/L (ref 136–145)
WBC # BLD AUTO: 4.33 THOUSAND/UL (ref 4.31–10.16)

## 2021-03-02 PROCEDURE — 36415 COLL VENOUS BLD VENIPUNCTURE: CPT

## 2021-03-02 PROCEDURE — 85025 COMPLETE CBC W/AUTO DIFF WBC: CPT

## 2021-03-02 PROCEDURE — 84153 ASSAY OF PSA TOTAL: CPT

## 2021-03-02 PROCEDURE — 83735 ASSAY OF MAGNESIUM: CPT

## 2021-03-02 PROCEDURE — 84154 ASSAY OF PSA FREE: CPT

## 2021-03-02 PROCEDURE — 80048 BASIC METABOLIC PNL TOTAL CA: CPT

## 2021-03-02 PROCEDURE — 84100 ASSAY OF PHOSPHORUS: CPT

## 2021-03-02 RX ORDER — LOSARTAN POTASSIUM 25 MG/1
25 TABLET ORAL DAILY
Qty: 30 TABLET | Refills: 2 | Status: SHIPPED | OUTPATIENT
Start: 2021-03-02 | End: 2021-03-16 | Stop reason: SDUPTHER

## 2021-03-02 RX ORDER — AMLODIPINE BESYLATE 10 MG/1
10 TABLET ORAL DAILY
Qty: 30 TABLET | Refills: 2 | Status: SHIPPED | OUTPATIENT
Start: 2021-03-02 | End: 2021-03-16 | Stop reason: SDUPTHER

## 2021-03-02 NOTE — TELEPHONE ENCOUNTER
Attempted to call patient to review most recent blood work  Further, called patient to review blood pressures and how he is feeling with the new medications  Will have office try to call patient as well  Unable to leave message as his voicemail is not set up

## 2021-03-02 NOTE — TELEPHONE ENCOUNTER
Spoke with patient regarding most recent blood work, creatinine 1 37, potassium 3 9, sodium 139  Patient states he feels much better than when he had when he was taking the lisinopril  Most recent blood pressure: 128/80  Refills have been sent to the pharmacy for the antihypertensives  All questions and concerns addressed  Advised patient to call our office if he has any questions

## 2021-03-04 LAB
PSA FREE MFR SERPL: 28.3 %
PSA FREE SERPL-MCNC: 0.17 NG/ML
PSA SERPL-MCNC: 0.6 NG/ML (ref 0–4)

## 2021-03-16 DIAGNOSIS — I16.0 HYPERTENSIVE URGENCY: ICD-10-CM

## 2021-03-16 DIAGNOSIS — I16.9 HYPERTENSIVE CRISIS: ICD-10-CM

## 2021-03-16 RX ORDER — LOSARTAN POTASSIUM 25 MG/1
25 TABLET ORAL DAILY
Qty: 90 TABLET | Refills: 3 | Status: SHIPPED | OUTPATIENT
Start: 2021-03-16

## 2021-03-16 RX ORDER — CARVEDILOL 12.5 MG/1
12.5 TABLET ORAL 2 TIMES DAILY WITH MEALS
Qty: 90 TABLET | Refills: 3 | Status: SHIPPED | OUTPATIENT
Start: 2021-03-16

## 2021-03-16 RX ORDER — AMLODIPINE BESYLATE 10 MG/1
10 TABLET ORAL DAILY
Qty: 90 TABLET | Refills: 3 | Status: SHIPPED | OUTPATIENT
Start: 2021-03-16 | End: 2021-09-15

## 2021-04-14 ENCOUNTER — APPOINTMENT (OUTPATIENT)
Dept: RADIOLOGY | Facility: OTHER | Age: 51
End: 2021-04-14

## 2021-04-14 ENCOUNTER — OFFICE VISIT (OUTPATIENT)
Dept: OBGYN CLINIC | Facility: OTHER | Age: 51
End: 2021-04-14

## 2021-04-14 VITALS
HEART RATE: 77 BPM | BODY MASS INDEX: 26.37 KG/M2 | SYSTOLIC BLOOD PRESSURE: 166 MMHG | DIASTOLIC BLOOD PRESSURE: 101 MMHG | HEIGHT: 73 IN | WEIGHT: 199 LBS

## 2021-04-14 DIAGNOSIS — M25.571 PAIN, JOINT, ANKLE AND FOOT, RIGHT: ICD-10-CM

## 2021-04-14 DIAGNOSIS — S82.201D CLOSED FRACTURE OF RIGHT TIBIA AND FIBULA WITH ROUTINE HEALING: ICD-10-CM

## 2021-04-14 DIAGNOSIS — S82.401D CLOSED FRACTURE OF RIGHT TIBIA AND FIBULA WITH ROUTINE HEALING: ICD-10-CM

## 2021-04-14 DIAGNOSIS — M25.571 PAIN, JOINT, ANKLE AND FOOT, RIGHT: Primary | ICD-10-CM

## 2021-04-14 DIAGNOSIS — S82.861A CLOSED DISPLACED MAISONNEUVE FRACTURE OF RIGHT LOWER EXTREMITY, INITIAL ENCOUNTER: ICD-10-CM

## 2021-04-14 DIAGNOSIS — S82.401D CLOSED FRACTURE OF SHAFT OF RIGHT FIBULA WITH ROUTINE HEALING, UNSPECIFIED FRACTURE MORPHOLOGY, SUBSEQUENT ENCOUNTER: ICD-10-CM

## 2021-04-14 PROCEDURE — 73590 X-RAY EXAM OF LOWER LEG: CPT

## 2021-04-14 PROCEDURE — 99213 OFFICE O/P EST LOW 20 MIN: CPT | Performed by: ORTHOPAEDIC SURGERY

## 2021-04-14 PROCEDURE — 73610 X-RAY EXAM OF ANKLE: CPT

## 2021-04-14 NOTE — PROGRESS NOTES
Orthopaedic Surgery - Office Note  Leidy Pantoja (38 y o  male)   : 1970   MRN: [id number]  Encounter Date: 2021    Chief Complaint   Patient presents with    Right Lower Leg - Follow-up       Assessment / Plan   Right ankle proximal fibula fracture with syndesmotic disruption /Maisonneuve injury sustained on 2021    · Stress views of the ankle continue to show with syndesmotic disruption but with no further displacement, nonstress views show good alignment ankle  · Continue with cam walker boot at this time with progression over the next 2 weeks to walking in the Cam boot as tolerated  At that time he was instructed to start weaning out of the Cam boot into a regular shoe over the following 2 weeks until he comes in for his follow-up  · Continue with ice and analgesics as needed for pain  Return in about 4 weeks (around 2021) for At that time we will get repeat x-rays with stress views of right ankle  History of Present Illness  Leidy Pantoja is a 53 y o  male following up status post right ankle injury sustained on 2021 after fall  Patient is doing well with pain management at this time and following the instructions of nonweightbearing in the cam walker boot using crutches  Over the last 2 weeks he has started putting weight on the right ankle and feels he is tolerating it well  He states that the swelling has continue to improve over time  He does not need to take any thing for pain at this point and this denying noticeable swelling  Denies any distal numbness or tingling at this time  Review of Systems  Pertinent items are noted in HPI  All other systems were reviewed and are negative  Physical Exam  BP (!) 166/101   Pulse 77   Ht 6' 1" (1 854 m)   Wt 90 3 kg (199 lb)   BMI 26 25 kg/m²   Cons: Appears well  No apparent distress  Psych: Alert  Oriented x3  Mood and affect normal   Eyes: PERRLA, EOMI  Resp: Normal effort    No audible wheezing or stridor  CV: Palpable pulse  No discernable arrhythmia  No LE edema  Lymph:  No palpable cervical, axillary, or inguinal lymphadenopathy  Skin: Warm  No palpable masses  No visible lesions  Neuro: Normal muscle tone  Normal and symmetric DTR's  Right Foot & Ankle Exam  Alignment:  Normal ankle alignment  Inspection:  No swelling  No edema  No erythema  No ecchymosis  Palpation:  No tenderness  ROM:  Not tested  Strength:  Not tested  Stability:  No objective ankle instablity  (-) Anterior  neutral and PF  (-) Talar Tilt  Tests:  (-) Syndesmosis squeeze test  (-) Mims test  (-) Peroneal tendon subluxation  Neurovascular:  Sensation intact in DP/SP/Yip/Sa/T nerve distributions  Sensation intact in all digital nerve distributions  Toes warm and perfused  Gait:  Not tested  Studies Reviewed  I have personally reviewed pertinent films in PACS  XR of right ankle and tibia - From 04/14/2021 shows proximal fibula fracture with good callus formation  Also positive stress you for widening of the ankle mortise medially, not worsening since last visit  AP views of the ankle show good alignment without stress  No other fracture or dislocation seen  Procedures  No procedures today  Medical, Surgical, Family, and Social History  The patient's medical history, family history, and social history, were reviewed and updated as appropriate  Past Medical History:   Diagnosis Date    Hypertension        Past Surgical History:   Procedure Laterality Date    MANDIBLE FRACTURE SURGERY         Family History   Problem Relation Age of Onset    Hypertension Mother     Kidney disease Mother     No Known Problems Father     Kidney disease Sister        Social History     Occupational History    Not on file   Tobacco Use    Smoking status: Never Smoker    Smokeless tobacco: Never Used   Substance and Sexual Activity    Alcohol use: Not Currently     Comment: Socially    Drug use:  Yes Types:  Marijuana     Comment: THC occasionally    Sexual activity: Not on file       No Known Allergies      Current Outpatient Medications:     amLODIPine (NORVASC) 10 mg tablet, Take 1 tablet (10 mg total) by mouth daily, Disp: 90 tablet, Rfl: 3    carvedilol (COREG) 12 5 mg tablet, Take 1 tablet (12 5 mg total) by mouth 2 (two) times a day with meals, Disp: 90 tablet, Rfl: 3    losartan (COZAAR) 25 mg tablet, Take 1 tablet (25 mg total) by mouth daily, Disp: 90 tablet, Rfl: 3      Anna Hawk PA-C    Scribe Attestation    I,:   am acting as a scribe while in the presence of the attending physician :       I,:   personally performed the services described in this documentation    as scribed in my presence :

## 2021-05-13 ENCOUNTER — TELEPHONE (OUTPATIENT)
Dept: NEPHROLOGY | Facility: CLINIC | Age: 51
End: 2021-05-13

## 2021-05-13 NOTE — TELEPHONE ENCOUNTER
Attempted to call patient to remind him of labs needed for follow up on 5/18  Unable to leave voicemail  Lab scripts faxed to patient address

## 2021-09-08 ENCOUNTER — TELEPHONE (OUTPATIENT)
Dept: NEPHROLOGY | Facility: CLINIC | Age: 51
End: 2021-09-08

## 2021-09-08 NOTE — TELEPHONE ENCOUNTER
Unable to leave voicemail ask no voicemail set up  Sent repeat lab scripts to patient address as these need to be completed prior to 9/15 follow up

## 2021-09-15 ENCOUNTER — APPOINTMENT (OUTPATIENT)
Dept: LAB | Facility: HOSPITAL | Age: 51
End: 2021-09-15

## 2021-09-15 ENCOUNTER — OFFICE VISIT (OUTPATIENT)
Dept: NEPHROLOGY | Facility: CLINIC | Age: 51
End: 2021-09-15

## 2021-09-15 VITALS
HEIGHT: 73 IN | BODY MASS INDEX: 25.84 KG/M2 | SYSTOLIC BLOOD PRESSURE: 154 MMHG | HEART RATE: 69 BPM | RESPIRATION RATE: 16 BRPM | WEIGHT: 195 LBS | DIASTOLIC BLOOD PRESSURE: 100 MMHG

## 2021-09-15 DIAGNOSIS — I10 ESSENTIAL HYPERTENSION: ICD-10-CM

## 2021-09-15 DIAGNOSIS — N18.31 STAGE 3A CHRONIC KIDNEY DISEASE (HCC): Primary | ICD-10-CM

## 2021-09-15 DIAGNOSIS — N17.9 AKI (ACUTE KIDNEY INJURY) (HCC): ICD-10-CM

## 2021-09-15 PROBLEM — E87.1 HYPONATREMIA: Status: RESOLVED | Noted: 2021-02-10 | Resolved: 2021-09-15

## 2021-09-15 LAB
25(OH)D3 SERPL-MCNC: 17.6 NG/ML (ref 30–100)
ANION GAP SERPL CALCULATED.3IONS-SCNC: 8 MMOL/L (ref 4–13)
BACTERIA UR QL AUTO: NORMAL /HPF
BILIRUB UR QL STRIP: NEGATIVE
BUN SERPL-MCNC: 17 MG/DL (ref 5–25)
CALCIUM SERPL-MCNC: 8.9 MG/DL (ref 8.3–10.1)
CHLORIDE SERPL-SCNC: 103 MMOL/L (ref 100–108)
CLARITY UR: CLEAR
CO2 SERPL-SCNC: 27 MMOL/L (ref 21–32)
COLOR UR: YELLOW
CREAT SERPL-MCNC: 1.74 MG/DL (ref 0.6–1.3)
CREAT UR-MCNC: 192.2 MG/DL
GFR SERPL CREATININE-BSD FRML MDRD: 52 ML/MIN/1.73SQ M
GLUCOSE SERPL-MCNC: 117 MG/DL (ref 65–140)
GLUCOSE UR STRIP-MCNC: NEGATIVE MG/DL
HGB UR QL STRIP.AUTO: NEGATIVE
KETONES UR STRIP-MCNC: NEGATIVE MG/DL
LEUKOCYTE ESTERASE UR QL STRIP: NEGATIVE
MAGNESIUM SERPL-MCNC: 2 MG/DL (ref 1.6–2.6)
NITRITE UR QL STRIP: NEGATIVE
NON-SQ EPI CELLS URNS QL MICRO: NORMAL /HPF
PH UR STRIP.AUTO: 5.5 [PH]
PHOSPHATE SERPL-MCNC: 3.5 MG/DL (ref 2.7–4.5)
POTASSIUM SERPL-SCNC: 4.1 MMOL/L (ref 3.5–5.3)
PROT UR STRIP-MCNC: NEGATIVE MG/DL
PROT UR-MCNC: 19 MG/DL
PROT/CREAT UR: 0.1 MG/G{CREAT} (ref 0–0.1)
PTH-INTACT SERPL-MCNC: 74.3 PG/ML (ref 18.4–80.1)
RBC #/AREA URNS AUTO: NORMAL /HPF
SODIUM SERPL-SCNC: 138 MMOL/L (ref 136–145)
SP GR UR STRIP.AUTO: >=1.03 (ref 1–1.03)
UROBILINOGEN UR QL STRIP.AUTO: 0.2 E.U./DL
WBC #/AREA URNS AUTO: NORMAL /HPF

## 2021-09-15 PROCEDURE — 83735 ASSAY OF MAGNESIUM: CPT

## 2021-09-15 PROCEDURE — 82306 VITAMIN D 25 HYDROXY: CPT

## 2021-09-15 PROCEDURE — 84156 ASSAY OF PROTEIN URINE: CPT

## 2021-09-15 PROCEDURE — 99214 OFFICE O/P EST MOD 30 MIN: CPT | Performed by: INTERNAL MEDICINE

## 2021-09-15 PROCEDURE — 83970 ASSAY OF PARATHORMONE: CPT

## 2021-09-15 PROCEDURE — 80048 BASIC METABOLIC PNL TOTAL CA: CPT

## 2021-09-15 PROCEDURE — 82570 ASSAY OF URINE CREATININE: CPT

## 2021-09-15 PROCEDURE — 84100 ASSAY OF PHOSPHORUS: CPT

## 2021-09-15 PROCEDURE — 36415 COLL VENOUS BLD VENIPUNCTURE: CPT

## 2021-09-15 PROCEDURE — 81001 URINALYSIS AUTO W/SCOPE: CPT

## 2021-09-15 RX ORDER — NIFEDIPINE 60 MG/1
60 TABLET, EXTENDED RELEASE ORAL
Qty: 90 TABLET | Refills: 3 | Status: SHIPPED | OUTPATIENT
Start: 2021-09-15

## 2021-09-15 NOTE — PROGRESS NOTES
Nephrology Follow up Consultation  Ehsan Houston 49 y o  male MRN: [id number]            BACKGROUND:  Ehsan Houston is a 49 y o male who was referred by MUSA Gutierrez for evaluation of Follow-up, Hyponatremia, Hypertension, and Chronic Kidney Disease    ASSESSMENT / PLAN:   49 y o   male with pmh of  Hypertension and CKD presents to the office for routine follow-up  CKD stage 2/3A:  -  after review of records In [facility] as well as Care everywhere patient has a baseline creatinine of 1 3-1 8mg/dL  Most recent labs show a Creatinine of  1 74 mg/dL on  09/15/2021  Renal function remains stable  -  There may be a certain degree of under estimation of GFR due to his muscular built  -  Likely has underlying CKD secondary to age-related nephron loss plus hypertensive nephrosclerosis plus prior episode of acute kidney injury non dialysis requiring   -  Renal ultrasound from 02/10/2021 showing right kidney  11 cm left kidney approximately 12 cm normal echogenicity no masses  - Acid base and lytes stable  - Clinically the patient appears to be   Euvolemic  - Recommend to avoid use of NSAIDs, nephrotoxins  Caution advised with regards to exposure to IV contrast dye    - Discussed with the patient in depth hs renal status, including the possible etiologies for CKD  - Advised the patient that when hisGFR is close to 20mL/min then will start discussing about RRT(renal replacement therapy) options such as renal transplant, peritoneal dialysis and hemodialysis  - Informed the patient about the various options for Renal Replacement therapy  - Discussed with the patient how we need to work together to delay the progression of CKD with optimal BP control based on their age and co-morbidities and trying to reduce proteinuria by the use of anti-proteinuric agents  Hypertension:  - Patient is on   Norvasc 10 mg p o  q day, Coreg 12 5 mg p o  b i d  Losartan 25 mg p o  q day     -  DC Norvasc and start Procardia XL 60 mg p o  q day   -   Advised patient appropriate techniques of checking blood pressures and to call with blood pressure updates in 2 weeks, if blood pressures continue to be elevated then will increase his losartan  -  Plasma metanephrines from February 2021 within normal limits aldosterone level low, renin normal   Likely secondary to patient being on lisinopril at that time   - Goal BP of < 140/90 based on age and comorbidities  - Instructed to follow low sodium (2gm)diet   - Advised to hold ACEI/ARBs if patient suffers from dehydration due to gastrointestinal losses due to risk of MITCHELL secondary to failure to autoregulate  Hemoglobin:  - Goal Hb of 10-12 g/dL  - Most recent labs suggestive of  10 6 grams/deciliter  -  Will follow IV iron at this time    CKD-MBD(Mineral Bone Disease): - Based on patients CKD stage following is the goal of therapy  - Maintain calcium phosphorus product of < 55   - Stage 3 CKD - Goal Ca 8 5-10 mg/dL , goal Phos 2 7-4 6 mg/dL  , goal iPTH 30-70 pg/mL  - Patient is currently at goal   - Continue patient on  No vitamin-D for now  -  Most recent intact PTH vitamin-D still pending    Proteinuria:  - most recent protein creatinine ratio still pending  Is last UA from February 2021 with no blood or protein  - check protein creatinine ratio  - currently on therapy for proteinuria with  losartan    Lipids:  - goal LDL less than 70  - management per primary team    Nutrition:  - Encouraged patient to follow a renal diet comprising of moderate potassium, low phosphorus and protein restriction to 0 8gm/kg  - Will check serum albumin with next blood work  Followup:  - Patient is to follow-up in 6 months, with lab work to be performed a few days prior to the next visit  Advised patient to call me in 10 days to review the results if they do not hear back from me, as I may have not received the results      Eloy Opitz, MD, FASN, 9/15/2021, 12:16 PM SUBJECTIVE: 49 y o  Male presents to the office for routine follow-up, was last seen in the office by nurse practitioner Rachelle Jenkins  In February 2021  Presents to the office feels well has no complaints no recent hospitalization no issues with edema not checking his blood pressures at home thankful for all the care information that he has gotten today denies taking any muscular supplements or protein supplements or creatinine supplements  Wishes to work together to bring his blood pressures down to do low CKD progression  Review of Systems   Constitutional: Negative for appetite change, chills, fatigue and fever  HENT: Negative for congestion, rhinorrhea and sore throat  Respiratory: Negative for cough, shortness of breath and wheezing  Cardiovascular: Negative for leg swelling  Gastrointestinal: Negative for abdominal pain, constipation, diarrhea, nausea and vomiting  Genitourinary: Negative for difficulty urinating, dysuria and hematuria  Musculoskeletal: Negative for back pain  Skin: Negative for rash and wound  Neurological: Negative for dizziness, light-headedness and headaches  Psychiatric/Behavioral: Negative for agitation and confusion  All other systems reviewed and are negative  PAST MEDICAL HISTORY:  Past Medical History:   Diagnosis Date    Hypertension        PROBLEM LIST    Patient Active Problem List   Diagnosis    Hypertensive urgency    Closed Maisonneuve fracture of right lower extremity    Normocytic anemia    Syndesmotic ankle sprain, right, initial encounter    Stage 3a chronic kidney disease ([address] )    Essential hypertension       PAST SURGICAL HISTORY:  Past Surgical History:   Procedure Laterality Date    MANDIBLE FRACTURE SURGERY         SOCIAL HISTORY :   reports that he has never smoked  He has never used smokeless tobacco  He reports previous alcohol use  He reports current drug use  Drug: Marijuana      FAMILY HISTORY:  Family History   Problem Relation Age of Onset    Hypertension Mother     Kidney disease Mother     No Known Problems Father     Kidney disease Sister        ALLERGIES:  No Known Allergies        PHYSICAL EXAM:  Vitals:    09/15/21 1142 09/15/21 1200   BP: 158/96 154/100   BP Location: Left arm    Patient Position: Sitting    Cuff Size: Standard    Pulse: 69    Resp: 16    Weight: 88 5 kg (195 lb)    Height: 6' 1" (1 854 m)      Body mass index is 25 73 kg/m²  Physical Exam  Vitals reviewed  Constitutional:       General: He is not in acute distress  Appearance: Normal appearance  He is normal weight  He is not ill-appearing, toxic-appearing or diaphoretic  HENT:      Head: Normocephalic and atraumatic  Mouth/Throat:      Mouth: Mucous membranes are moist       Pharynx: Oropharynx is clear  No oropharyngeal exudate  Eyes:      General: No scleral icterus  Conjunctiva/sclera: Conjunctivae normal    Cardiovascular:      Rate and Rhythm: Normal rate  Heart sounds: Normal heart sounds  No friction rub  Pulmonary:      Effort: Pulmonary effort is normal  No respiratory distress  Breath sounds: Normal breath sounds  No stridor  No wheezing  Abdominal:      General: There is no distension  Palpations: Abdomen is soft  There is no mass  Tenderness: There is no abdominal tenderness  There is no right CVA tenderness or left CVA tenderness  Musculoskeletal:         General: No swelling  Cervical back: Normal range of motion and neck supple  Skin:     General: Skin is warm  Coloration: Skin is not jaundiced  Neurological:      General: No focal deficit present  Mental Status: He is alert and oriented to person, place, and time     Psychiatric:         Mood and Affect: Mood normal          Behavior: Behavior normal          LABORATORY DATA:     Results from last 6 Months   Lab Units 09/15/21  1051   POTASSIUM mmol/L 4 1   CHLORIDE mmol/L 103   CO2 mmol/L 27   BUN mg/dL 17   CREATININE mg/dL 1 74*   CALCIUM mg/dL 8 9   MAGNESIUM mg/dL 2 0   PHOSPHORUS mg/dL 3 5        rest all reviewed    RADIOLOGY:  No orders to display     Rest all reviewed        MEDICATIONS:    Current Outpatient Medications:     carvedilol (COREG) 12 5 mg tablet, Take 1 tablet (12 5 mg total) by mouth 2 (two) times a day with meals, Disp: 90 tablet, Rfl: 3    losartan (COZAAR) 25 mg tablet, Take 1 tablet (25 mg total) by mouth daily, Disp: 90 tablet, Rfl: 3    NIFEdipine (PROCARDIA XL) 60 mg 24 hr tablet, Take 1 tablet (60 mg total) by mouth daily at bedtime, Disp: 90 tablet, Rfl: 3          Portions of the record may have been created with voice recognition software  Occasional wrong word or "sound a like" substitutions may have occurred due to the inherent limitations of voice recognition software  Read the chart carefully and recognize, using context, where substitutions have occurred  If you have any questions, please contact the dictating provider

## 2021-09-15 NOTE — PATIENT INSTRUCTIONS
- stop the norvasc (amlodipine)  - start procardia XL 60mg at bedtime  - continue the losartan and coreg  - check blood pressure routinely for 2 weeks and call with updates    - Please call me in 10 days after having your blood work done to review the results if you do not hear back from me or my office, as I may have not received the results  - please remember to perform blood work prior to the next visit  - Please call if the blood pressure top number is greater than 150 or less than 110 consistently  - Please call if you are gaining more than 2lbs in 2 days for adjustment of water pills   ~ Please AVOID the following pain medications  LIST OF NSAIDS (NONSTEROIDAL ANTI-INFLAMMATORY DRUGS) AND MAGANA-2 INHIBITORS    DIFLUNISAL (DOLOBID)  IBUPROFEN (MOTRIN, ADVIL)  FLURBIPROFEN (ANSAID)  KETOPROFEN (ORUDIS, ORUVAIL)  FENOPROFEN (NALFON)  NABUMETONE (RELAFEN)  PIROXICAM (FELDENE)  NAPROXEN (ALEVE, NAPROSYN, NAPRELAN, ANAPROX)  DICLOFENAC (VOLTAREN, CATAFLAM)  INDOMETHACIN (INDOCIN)  SULINDAC (CLINORIL)  TOLMETIN (TOLETIN)  ETODOLAC (LODINE)  MELOXICAM (MOBIC)  KETOROLAC (TORADOL)  OXAPROZIN (DAYPRO)  CELECOXIB (CELEBREX)    Phosphorus diet  Follow a low phosphorus diet      Avoid these higher phosphorus foods: Choose these lower phosphorus foods:   Milk, pudding or yogurt (from animals and from many soy varieties) Rice milk (unfortified), nondairy creamer (if it doesn't have terms in the ingredients list that contain the letters "phos")   Hard cheeses, ricotta or cottage cheese, fat-free cream cheese Regular and low-fat cream cheese   Ice cream or frozen yogurt Sherbet or frozen fruit pops   Soups made with higher phosphorus ingredients (milk, dried peas, beans, lentils) Soups made with lower phosphorus ingredients (broth- or water-based with other lower phosphorus ingredients)   Whole grains, including whole-grain breads, crackers, cereal, rice and pasta Refined grains, including white bread, crackers, cereals, rice and pasta   Quick breads, biscuits, cornbread, muffins, pancakes or waffles Homemade refined (white) dinner rolls, bagels or English muffins   Dried peas (split, black-eyed), beans (black, garbanzo, lima, kidney, navy, ritchie) or lentils Green peas (canned, frozen), green beans or wax beans   Organ meats, walleye, pollock or sardines Lean beef, pork, lamb, poultry or other fish   Nuts and seeds Popcorn   Peanut butter and other nut butters Jam, jelly or honey   Chocolate, including chocolate drinks Carob (chocolate-flavored) candy, hard candy or gumdrops   Oanh and pepper-type sodas, flavored james, bottled teas (if a term in the ingredients list contains the letters "phos") Lemon-lime soda, ginger ale or root beer, plain water     Follow a moderate potassium diet  Things to do to reduce your blood pressure include working with all your physician to do the following:  ~ stop smoking if you smoke  ~ increase cardiovascular exercise like walking and swimming    ~ modify your diet to decrease fat and salt intake  ~ reduce your weight if you are overweight or obese   ~ increase the consumption of fruits, vegetables and whole grains  ~ decrease alcohol consumption if you consume alcohol    ~ try to minimize stress in your life with lifestyle modifications  ~ be compliant with your anti-hypertensive medications  ~ adjust your medications to help improve your vascular stiffness and decrease risks for heart attacks and strokes

## 2021-09-27 ENCOUNTER — TELEPHONE (OUTPATIENT)
Dept: NEPHROLOGY | Facility: CLINIC | Age: 51
End: 2021-09-27

## 2021-09-27 NOTE — TELEPHONE ENCOUNTER
Confirmed with patient he is to take nifedipine and not amlodpine  Patient has verbalized understanding and will pick medication up at the pharmacy

## 2021-09-27 NOTE — TELEPHONE ENCOUNTER
Received a call from patient stating he went to pickup procardia xl from the pharmacy but it has not been filled yet  Patient uses [facility] pharmacy on [address]       Patient would like to be notified as soon as possible once it is available [phone]

## 2021-09-27 NOTE — TELEPHONE ENCOUNTER
Spoke with patient's pharmacy and they stated patient was unsure if he was to be taking the nifedipine or the amlodipine  I have confirmed with pharmacy that he is to be taking the nifedipine 60 mg daily and d/c amlodipine  Attempted to contact patient and unable to leave voicemail

## 2022-02-15 ENCOUNTER — TELEPHONE (OUTPATIENT)
Dept: NEPHROLOGY | Facility: CLINIC | Age: 52
End: 2022-02-15

## 2022-02-15 NOTE — TELEPHONE ENCOUNTER
Tried to call patient to schedule a follow up with Dr Dario De La Cruz, but the voice mail box is not set up

## 2022-02-28 NOTE — TELEPHONE ENCOUNTER
Tried to call patient to schedule a follow up, but the number is no longer in service  This is the second attempt  Will be sending letter

## 2022-12-06 ENCOUNTER — TELEPHONE (OUTPATIENT)
Dept: NEPHROLOGY | Facility: HOSPITAL | Age: 52
End: 2022-12-06

## 2023-02-20 DIAGNOSIS — I16.0 HYPERTENSIVE URGENCY: ICD-10-CM

## 2023-02-20 RX ORDER — LOSARTAN POTASSIUM 25 MG/1
25 TABLET ORAL DAILY
Qty: 90 TABLET | Refills: 3 | Status: SHIPPED | OUTPATIENT
Start: 2023-02-20

## 2023-02-20 RX ORDER — LOSARTAN POTASSIUM 25 MG/1
25 TABLET ORAL DAILY
Qty: 90 TABLET | Refills: 3 | Status: SHIPPED | OUTPATIENT
Start: 2023-02-20 | End: 2023-02-20

## 2024-03-21 NOTE — ASSESSMENT & PLAN NOTE
No active signs of bleeding   Unclear baseline no records to review   Could be aspect of anemia of chronic disease with likely underlying CKD   Outpatient workup with PCP 98